# Patient Record
Sex: MALE | Race: WHITE | URBAN - METROPOLITAN AREA
[De-identification: names, ages, dates, MRNs, and addresses within clinical notes are randomized per-mention and may not be internally consistent; named-entity substitution may affect disease eponyms.]

---

## 2017-01-17 ENCOUNTER — TRANSFERRED RECORDS (OUTPATIENT)
Dept: HEALTH INFORMATION MANAGEMENT | Facility: CLINIC | Age: 62
End: 2017-01-17

## 2017-02-24 ENCOUNTER — COMMUNICATION - HEALTHEAST (OUTPATIENT)
Dept: VASCULAR SURGERY | Facility: CLINIC | Age: 62
End: 2017-02-24

## 2017-03-01 ENCOUNTER — OFFICE VISIT - HEALTHEAST (OUTPATIENT)
Dept: VASCULAR SURGERY | Facility: CLINIC | Age: 62
End: 2017-03-01

## 2017-03-01 DIAGNOSIS — I87.2 VENOUS INSUFFICIENCY OF BOTH LOWER EXTREMITIES: ICD-10-CM

## 2017-03-01 DIAGNOSIS — I83.893 SYMPTOMATIC VARICOSE VEINS OF BOTH LOWER EXTREMITIES: ICD-10-CM

## 2017-03-14 ENCOUNTER — OFFICE VISIT (OUTPATIENT)
Dept: PSYCHIATRY | Facility: CLINIC | Age: 62
End: 2017-03-14
Attending: PSYCHIATRY & NEUROLOGY
Payer: MEDICARE

## 2017-03-14 VITALS — WEIGHT: 213.96 LBS | SYSTOLIC BLOOD PRESSURE: 151 MMHG | HEART RATE: 85 BPM | DIASTOLIC BLOOD PRESSURE: 90 MMHG

## 2017-03-14 DIAGNOSIS — Z79.899 ENCOUNTER FOR LONG-TERM (CURRENT) USE OF MEDICATIONS: Primary | ICD-10-CM

## 2017-03-14 DIAGNOSIS — F11.21 OPIOID DEPENDENCE IN REMISSION (H): ICD-10-CM

## 2017-03-14 PROCEDURE — 99212 OFFICE O/P EST SF 10 MIN: CPT | Mod: ZF

## 2017-03-14 RX ORDER — BUPRENORPHINE 2 MG/1
4 TABLET SUBLINGUAL 4 TIMES DAILY
Qty: 240 TABLET | Refills: 2
Start: 2017-03-14 | End: 2017-06-06

## 2017-03-14 RX ORDER — BUPRENORPHINE 2 MG/1
4 TABLET SUBLINGUAL 4 TIMES DAILY
Qty: 240 TABLET | Refills: 2 | Status: SHIPPED | OUTPATIENT
Start: 2017-03-14 | End: 2017-03-14

## 2017-03-14 NOTE — NURSING NOTE
Chief Complaint   Patient presents with     Recheck Medication     opioid type dependence     Reviewed allergies, smoking status, and pharmacy preference  Administered abuse screening questions   Obtained weight, blood pressure and heart rate

## 2017-03-14 NOTE — MR AVS SNAPSHOT
After Visit Summary   3/14/2017    Mauro Tamez    MRN: [id number]           Patient Information     Date Of Birth          1955        Visit Information        Provider Department      3/14/2017 1:30 PM Khloe Boyce MD Psychiatry Clinic        Today's Diagnoses     Encounter for long-term (current) use of medications    -  1    Opioid dependence in remission (H)           Follow-ups after your visit        Follow-up notes from your care team     Return in about 3 months (around 2017).      Your next 10 appointments already scheduled     2017  1:30 PM CDT   Adult Med Follow UP with Khloe Boyce MD   Psychiatry Clinic ([facility])    [address] [facility] [address] [phone]              Who to contact     Please call your clinic at [phone] to:    Ask questions about your health    Make or cancel appointments    Discuss your medicines    Learn about your test results    Speak to your doctor   If you have compliments or concerns about an experience at your clinic, or if you wish to file a complaint, please contact [facility] Patient Relations at [phone] or email us at [email or web address]         Additional Information About Your Visit        MyChart Information     PartSimplet is an electronic gateway that provides easy, online access to your medical records. With mygola, you can request a clinic appointment, read your test results, renew a prescription or communicate with your care team.     To sign up for PartSimplet visit the website at [email or web address]   You will be asked to enter the access code listed below, as well as some personal information. Please follow the directions to create your username and password.     Your access code is: [email or web address]  Expires: 2017  9:02 AM     Your access code will  in 90 days. If you need help or a new  code, please contact your [facility] or call [phone] for assistance.        Care EveryWhere ID     This is your Care EveryWhere ID. This could be used by other organizations to access your [place] medical records  [id number]        Your Vitals Were     Pulse                   85            Blood Pressure from Last 3 Encounters:   03/14/17 151/90   12/13/16 119/80   10/18/16 144/85    Weight from Last 3 Encounters:   03/14/17 97.1 kg (213 lb 15.4 oz)   12/13/16 98.9 kg (218 lb)   10/18/16 97.1 kg (214 lb)                 Today's Medication Changes          These changes are accurate as of: 3/14/17 11:59 PM.  If you have any questions, ask your nurse or doctor.               Start taking these medicines.        Dose/Directions    buprenorphine 2 MG Subl sublingual tablet   Commonly known as:  SUBUTEX   Used for:  Opioid dependence in remission (H)   Started by:  Hannah Monroy RN        Dose:  4 mg   Place 2 tablets (4 mg) under the tongue 4 times daily   Quantity:  240 tablet   Refills:  2            Where to get your medicines      Some of these will need a paper prescription and others can be bought over the counter.  Ask your nurse if you have questions.     You don't need a prescription for these medications     buprenorphine 2 MG Subl sublingual tablet                Primary Care Provider Office Phone # Fax #    Deann Crandall [phone]       [place] [id number]        Thank you!     Thank you for choosing PSYCHIATRY CLINIC  for your care. Our goal is always to provide you with excellent care. Hearing back from our patients is one way we can continue to improve our services. Please take a few minutes to complete the written survey that you may receive in the mail after your visit with us. Thank you!             Your Updated Medication List - Protect others around you: Learn how to safely use, store  and throw away your medicines at www.disposemymeds.org.          This list is accurate as of: 3/14/17 11:59 PM.  Always use your most recent med list.                   Brand Name Dispense Instructions for use    buprenorphine 2 MG Subl sublingual tablet    SUBUTEX    240 tablet    Place 2 tablets (4 mg) under the tongue 4 times daily       ISENTRESS PO      Take  by mouth.       naproxen 500 MG tablet    NAPROSYN     Take 500 mg by mouth 2 times daily (with meals). prn       TRUVADA 200-300 MG per tablet   Generic drug:  emtricitabine-tenofovir      Take 1 tablet by mouth daily.

## 2017-03-14 NOTE — PROGRESS NOTES
"Progress Notes  Encounter Date: 3-14-17  Khloe Boyce MD   Psychiatry      PSYCHIATRY VISIT:  The patient was seen for evaluation and management of opioid use disorder.   Total time 30 minutes, counseling and coordination of care 20 minutes.   The patient was seen 3 months ago.  INTERIM HX: He is distressed by the national events but is able to work his program and maintain a positive attitude.  He is able to maintain an \"attitude of gratitude.\" As part o f his program he goes to penitentiary/detox. He has a very strong daphne and commitment to recovery to maintain a spiritual base, working the steps, and prayer. He has sponsor, sponsees, recovery friends.  Mood has been ok.   The grandchildren are not currently a part of his life.  He is accepting of this though hopes this will be different.   He has not filled rxs for oxycodone since December 2016.  Hip pain is managed by aleve. He wears his knee brace.   He continues to get Rxs for oxycodone for his pain and although it shouldn't have any effect, he finds that it does. It is being prescribed by his primary. We have discussed this in some detail; as we have the BZ which he uses infrequently. He is very cautious with his meds.    Last use of drugs was 1988, last alcohol 18 yrs ago.      MEDICATIONS:   Subutex 4 mg qid;    lorazapam 1.0 mg QHS infrequently; 4 of them this past month. He gets 12/month.  Oxycodone for pain #45/month - not taken since December  HIV meds unchanged from last visit. . Truvada, Esentress , no gabapentin. Dr. Rogers is his physician for HIV, testosterone. In the past on Trazodone but it made his mouth very dry and still could not stay asleep.     SE: sexual dysfunction,  He is comfortable on this dose physically and does not think it possible after 12 yrs of being on the med, to go off even with a slow taper. It has not worked in the past; he becomes too uncomfortable. Sleep wakes many times.     MENTAL STATUS EXAM: The patient is neat, on " time for appointment. Mood is irritable, affect consistent.  . Thought processes are logical and goal directed. Thought content is normal. Associations intact. Speech RRR, language fluent, concentration good, fund of knowledge good, memory intact, Insight and judgment are good.   I spent a total of 30 minutes face-to-face with Mauro Tamez during today's office visit. Over 50% of this time was spent counseling the patient and/or coordinating care regarding the combination of medications and risks/benefits . See note for details.           ASSESSMENT: Opioid dependence, in full remission, on agonist; AIDS, chronic pain, s/p rotator cuff surgery. Venous insufficiency, groin pain, S/P DVT  PLAN:   Continue subutex dose to 4 mg qid- he finds it works best .   RTC 3 months,    U tox at his primary's office

## 2017-03-14 NOTE — TELEPHONE ENCOUNTER
Per STAR from Dr. Boyce a prescription for Subutex 2 mg tabs; take 2 tabs QID #240, 2 RF is phoned to [facility] as script is unable to print in clinic.

## 2017-03-15 NOTE — TELEPHONE ENCOUNTER
-Today located 2 unsigned scripts for Subutex  -Both scripts discarded as refills were phoned to pharmacy yesterday

## 2017-03-28 DIAGNOSIS — Z79.899 ENCOUNTER FOR LONG-TERM (CURRENT) USE OF MEDICATIONS: ICD-10-CM

## 2017-03-28 LAB
AMPHETAMINES UR QL SCN: NORMAL
BARBITURATES UR QL: NORMAL
BENZODIAZ UR QL: NORMAL
CANNABINOIDS UR QL SCN: NORMAL
COCAINE UR QL: NORMAL
ETHANOL UR QL SCN: NORMAL
OPIATES UR QL SCN: NORMAL
PCP UR QL SCN: NORMAL

## 2017-03-28 PROCEDURE — 80307 DRUG TEST PRSMV CHEM ANLYZR: CPT | Performed by: PSYCHIATRY & NEUROLOGY

## 2017-03-28 PROCEDURE — 80306 DRUG TEST PRSMV INSTRMNT: CPT | Performed by: PSYCHIATRY & NEUROLOGY

## 2017-03-28 PROCEDURE — 80320 DRUG SCREEN QUANTALCOHOLS: CPT | Performed by: PSYCHIATRY & NEUROLOGY

## 2017-03-29 ENCOUNTER — TELEPHONE (OUTPATIENT)
Dept: PSYCHIATRY | Facility: CLINIC | Age: 62
End: 2017-03-29

## 2017-03-29 LAB
AMPHETAMINE URINE: NORMAL NG/ML
BARBITUATES URINE: NORMAL NG/ML
BENZODIAZEPINES URINE: NORMAL NG/ML
CREATININE, URINE: 62 MG/DL
FENTANYL: NORMAL NG/ML
Lab: NORMAL NG/ML
METHADONE URINE: NORMAL NG/ML
MORPHINE URINE: NORMAL NG/ML
OXYCODONE URINE: NORMAL NG/ML
PH UR STRIP: NORMAL PH (ref 5–7)
PROPOXYPHENE, URINE: NORMAL NG/ML
THC URINE: NORMAL NG/ML

## 2017-03-29 NOTE — TELEPHONE ENCOUNTER
Received multiple urine screening results from [facility], drawn on 01/17/2017. Results entered into EMR. Sent to Dr. Boyce for review. Original fax placed in scanning on 03/29/2017 and a copy was kept in psychiatry until scanning completed/confirmed. Maria G Shelley LPN

## 2017-03-30 LAB
AMPHETAMINES UR QL: NEGATIVE NG/ML
BARBITURATES UR QL SCN: NEGATIVE NG/ML
BENZODIAZ UR QL SCN: NEGATIVE NG/ML
BUPRENORPHINE UR QL: NORMAL
BUPRENORPHINE UR QL: POSITIVE NG/ML
CANNABINOIDS UR QL: NEGATIVE NG/ML
COCAINE UR QL SCN: NEGATIVE NG/ML
D-METHAMPHET UR QL: NEGATIVE NG/ML
METHADONE UR QL SCN: NEGATIVE NG/ML
OPIATES UR QL SCN: NEGATIVE NG/ML
OXYCODONE UR QL SCN: NEGATIVE NG/ML
PCP UR QL SCN: NEGATIVE NG/ML
PROPOXYPH UR QL: NEGATIVE NG/ML
TRICYCLICS UR QL SCN: NEGATIVE NG/ML

## 2017-04-13 ENCOUNTER — AMBULATORY - HEALTHEAST (OUTPATIENT)
Dept: VASCULAR SURGERY | Facility: CLINIC | Age: 62
End: 2017-04-13

## 2017-04-13 ENCOUNTER — COMMUNICATION - HEALTHEAST (OUTPATIENT)
Dept: VASCULAR SURGERY | Facility: CLINIC | Age: 62
End: 2017-04-13

## 2017-04-13 DIAGNOSIS — I83.893 SYMPTOMATIC VARICOSE VEINS OF BOTH LOWER EXTREMITIES: ICD-10-CM

## 2017-06-06 ENCOUNTER — OFFICE VISIT (OUTPATIENT)
Dept: PSYCHIATRY | Facility: CLINIC | Age: 62
End: 2017-06-06
Attending: PSYCHIATRY & NEUROLOGY
Payer: MEDICARE

## 2017-06-06 VITALS — HEART RATE: 67 BPM | WEIGHT: 212.6 LBS | SYSTOLIC BLOOD PRESSURE: 111 MMHG | DIASTOLIC BLOOD PRESSURE: 83 MMHG

## 2017-06-06 DIAGNOSIS — F11.21 OPIOID DEPENDENCE IN REMISSION (H): ICD-10-CM

## 2017-06-06 DIAGNOSIS — Z79.899 ENCOUNTER FOR LONG-TERM (CURRENT) USE OF MEDICATIONS: Primary | ICD-10-CM

## 2017-06-06 PROCEDURE — 99212 OFFICE O/P EST SF 10 MIN: CPT | Mod: ZF

## 2017-06-06 RX ORDER — BUPRENORPHINE 2 MG/1
4 TABLET SUBLINGUAL 4 TIMES DAILY
Qty: 240 TABLET | Refills: 2 | Status: SHIPPED | OUTPATIENT
Start: 2017-06-06 | End: 2017-09-05

## 2017-06-06 NOTE — PROGRESS NOTES
Progress Notes  Encounter Date: 6-6-17  Khloe Boyce MD   Psychiatry       PSYCHIATRY VISIT:  The patient was seen for evaluation and management of opioid use disorder.    The patient was seen 3 months ago.  INTERIM HX: Recovery continues to be stable, very active in AA.  .  He attends meetings, sponsors others, attended [facility] and brought a young person who chronically relapses. He does prayer daily - p 552 - freedom from resentment.  He begins the appt by saying his life is stable, bored at times.  He fills his days with activities, movies.   Mood is ok. His main concern is trouble sleeping. He wakes 3x/night whether or not he takes Ativan.  He has pain in his groin and leg and just had an MRI . He may yell out in pain. He is hoping that he gets some answers.  Finances are stretched and this is an ongoing source of stress.    He continues to get Rxs for oxycodone for his pain and although it shouldn't have any effect with suboxone on board, he finds that it does. It is being prescribed by his primary. We have discussed this in some detail; as we have the BZ which he uses infrequently. He is very cautious with his meds. He has an agreement with his primary that he will continue to receive monthly prescriptions but if he does not need the pain meds then he shreds the rx.     Last use of drugs was 1988, last alcohol 18 yrs ago.  MEDICATIONS:   Subutex 4 mg qid;     lorazapam 1.0 mg QHS infrequently; none in past 2 weeks.  He gets 12/month.  Oxycodone for pain #45/month - not taken since April. If doesn't use it , brings to office and it is shredded.   HIV meds unchanged from last visit. . Truvada, Esentress , no gabapentin. Dr. Rogers is his physician for HIV, testosterone. In the past on Trazodone but it made his mouth very dry and still could not stay asleep.      SE: sexual dysfunction,  He is comfortable on this dose physically and does not think it possible after 12 yrs of being on the med, to go off  even with a slow taper. It has not worked in the past; he becomes too uncomfortable. Sleep wakes many times.      MENTAL STATUS EXAM: The patient is neat, on time for appointment. Mood is fair, affect consistent.  . Thought processes are logical and goal directed. Thought content is normal. Associations intact. Speech RRR, language fluent, concentration good, fund of knowledge good, memory intact, Insight and judgment are good.   I spent a total of 30 minutes face-to-face with Mauro Tamez during today's office visit. Over 50% of this time was spent counseling the patient and/or coordinating care regarding the combination of medications and risks/benefits . See note for details.         ASSESSMENT: Opioid dependence, in full remission, on agonist; AIDS, chronic pain, s/p rotator cuff surgery. Venous insufficiency, groin pain, S/P DVT  PLAN:   Continue subutex dose to 4 mg qid- he finds it works best .   RTC 3 months,    U tox today:   Results: negative except for bup

## 2017-06-06 NOTE — MR AVS SNAPSHOT
After Visit Summary   2017    Mauro Tamez    MRN: [id number]           Patient Information     Date Of Birth          1955        Visit Information        Provider Department      2017 1:30 PM Khloe Boyce MD Psychiatry Clinic        Today's Diagnoses     Encounter for long-term (current) use of medications    -  1    Opioid dependence in remission (H)           Follow-ups after your visit        Follow-up notes from your care team     Return in about 3 months (around 2017).      Your next 10 appointments already scheduled     Sep 05, 2017  1:00 PM CDT   Adult Med Follow UP with Khloe Boyce MD   Psychiatry Clinic ([facility])    [address] [facility] [address] [phone]              Who to contact     Please call your clinic at [phone] to:    Ask questions about your health    Make or cancel appointments    Discuss your medicines    Learn about your test results    Speak to your doctor   If you have compliments or concerns about an experience at your clinic, or if you wish to file a complaint, please contact [facility] Patient Relations at [phone] or email us at [email or web address]         Additional Information About Your Visit        MyChart Information     Social GameWorkst is an electronic gateway that provides easy, online access to your medical records. With ShoutWire, you can request a clinic appointment, read your test results, renew a prescription or communicate with your care team.     To sign up for Social GameWorkst visit the website at [email or web address]   You will be asked to enter the access code listed below, as well as some personal information. Please follow the directions to create your username and password.     Your access code is: [email or web address]  Expires: 10/7/2017 10:56 PM     Your access code will  in 90 days. If you need help or a new  code, please contact your [facility] or call [phone] for assistance.        Care EveryWhere ID     This is your Care EveryWhere ID. This could be used by other organizations to access your [place] medical records  [id number]        Your Vitals Were     Pulse                   67            Blood Pressure from Last 3 Encounters:   06/06/17 111/83   03/14/17 151/90   12/13/16 119/80    Weight from Last 3 Encounters:   06/06/17 96.4 kg (212 lb 9.6 oz)   03/14/17 97.1 kg (213 lb 15.4 oz)   12/13/16 98.9 kg (218 lb)                 Where to get your medicines      Some of these will need a paper prescription and others can be bought over the counter.  Ask your nurse if you have questions.     Bring a paper prescription for each of these medications     buprenorphine 2 MG Subl sublingual tablet          Primary Care Provider Office Phone # Fax #    Deann Crandall [phone]       [address] [address]        Equal Access to Services     [facility]: Hadii aad ku hadasho Sogloria, waaxda luqadaha, qaybta kaalmada rani, tal valladares . So [facility] [phone].    ATENCIÓN: Si habla español, tiene a vernon disposición servicios gratuitos de asistencia lingüística. [facility] [phone].    We comply with applicable federal civil rights laws and [place] laws. We do not discriminate on the basis of race, color, national origin, age, disability sex, sexual orientation or gender identity.            Thank you!     Thank you for choosing PSYCHIATRY CLINIC  for your care. Our goal is always to provide you with excellent care. Hearing back from our patients is one way we can continue to improve our services. Please take a few minutes to complete the written survey that you may receive in the mail after your visit with us. Thank you!             Your Updated Medication List - Protect others around  you: Learn how to safely use, store and throw away your medicines at www.disposemymeds.org.          This list is accurate as of: 6/6/17 11:59 PM.  Always use your most recent med list.                   Brand Name Dispense Instructions for use Diagnosis    buprenorphine 2 MG Subl sublingual tablet    SUBUTEX    240 tablet    Place 2 tablets (4 mg) under the tongue 4 times daily    Opioid dependence in remission (H)       ISENTRESS PO      Take  by mouth.        naproxen 500 MG tablet    NAPROSYN     Take 500 mg by mouth 2 times daily (with meals). prn        TRUVADA 200-300 MG per tablet   Generic drug:  emtricitabine-tenofovir      Take 1 tablet by mouth daily.

## 2017-07-03 ENCOUNTER — TELEPHONE (OUTPATIENT)
Dept: PSYCHIATRY | Facility: CLINIC | Age: 62
End: 2017-07-03

## 2017-07-03 NOTE — TELEPHONE ENCOUNTER
Appt history:  Last seen: 6/6/17 (note unsigned)  Next appt: 9/5/17    Incoming fax:   -Rec'd fax from [facility]  -Their records indicate that pt is receiving buprenorphine and oxycodone    Per [place] :   Oxycodone 5 mg tabs #45 filled on:   1/5/17  2/4/17  3/19/17  4/23/17  6/24/17    Ativan 2 mg tabs #12 filled on:   1/30/17  3/5/17  4/6/17  5/15/17  6/18/17    Valium 2 mg tab #20 filled on:   4/23/17    Plan:   -Will alert Dr. Boyce

## 2017-07-06 DIAGNOSIS — F11.21 OPIOID DEPENDENCE IN REMISSION (H): ICD-10-CM

## 2017-07-06 DIAGNOSIS — Z79.899 ENCOUNTER FOR LONG-TERM (CURRENT) USE OF MEDICATIONS: ICD-10-CM

## 2017-07-06 PROCEDURE — 80299 QUANTITATIVE ASSAY DRUG: CPT | Performed by: PSYCHIATRY & NEUROLOGY

## 2017-07-06 PROCEDURE — 80307 DRUG TEST PRSMV CHEM ANLYZR: CPT | Performed by: PSYCHIATRY & NEUROLOGY

## 2017-07-06 PROCEDURE — 80320 DRUG SCREEN QUANTALCOHOLS: CPT | Performed by: PSYCHIATRY & NEUROLOGY

## 2017-07-09 LAB
BUPRENORPHINE GLUCURONIDE URINE: 60
BUPRENORPHINE UR CFM-MCNC: 6 NG/ML
NALOXONE URINE: NORMAL
NORBUPRENORPHINE GLUCURONIDE URINE: 192
NORBUPRENORPHINE UR CFM-MCNC: 55 NG/ML

## 2017-07-13 ENCOUNTER — TELEPHONE (OUTPATIENT)
Dept: PSYCHIATRY | Facility: CLINIC | Age: 62
End: 2017-07-13

## 2017-07-13 DIAGNOSIS — F11.21 OPIOID DEPENDENCE IN REMISSION (H): ICD-10-CM

## 2017-07-13 NOTE — TELEPHONE ENCOUNTER
-Rec'd handwritten letter from pt along with letter from [employer]  -Coverage for buprenorphine 2 mg tabs will  on 17  -Included is Quantity Limit Exception form  -Form completed and faxed to Josette at [phone] -lvM for pt at home number with this information (did not leave specific medication name on vm)  -Shared that writer would update pt once decision has been made  -Clinic number provided for c/b with any questions or concerns prior to then.

## 2017-07-14 NOTE — TELEPHONE ENCOUNTER
-Rec'd fax from Josette CRUZ approved from 4/15/17 - 7/14/18  -Pt notified via vm (did not leave name of medication on vm)  -Clinic number provided for c/b with questions  -PA forms placed in scanning

## 2017-09-05 ENCOUNTER — OFFICE VISIT (OUTPATIENT)
Dept: PSYCHIATRY | Facility: CLINIC | Age: 62
End: 2017-09-05
Attending: PSYCHIATRY & NEUROLOGY
Payer: MEDICARE

## 2017-09-05 VITALS — SYSTOLIC BLOOD PRESSURE: 139 MMHG | HEART RATE: 84 BPM | DIASTOLIC BLOOD PRESSURE: 84 MMHG | WEIGHT: 208.8 LBS

## 2017-09-05 DIAGNOSIS — F11.21 OPIOID DEPENDENCE IN REMISSION (H): ICD-10-CM

## 2017-09-05 DIAGNOSIS — Z79.899 ENCOUNTER FOR LONG-TERM (CURRENT) USE OF MEDICATIONS: ICD-10-CM

## 2017-09-05 DIAGNOSIS — Z79.899 ENCOUNTER FOR LONG-TERM (CURRENT) USE OF MEDICATIONS: Primary | ICD-10-CM

## 2017-09-05 LAB
AMPHETAMINES UR QL SCN: NEGATIVE
BARBITURATES UR QL: NEGATIVE
BENZODIAZ UR QL: NEGATIVE
CANNABINOIDS UR QL SCN: NEGATIVE
COCAINE UR QL: NEGATIVE
ETHANOL UR QL SCN: NEGATIVE
OPIATES UR QL SCN: NEGATIVE
PCP UR QL SCN: NEGATIVE

## 2017-09-05 PROCEDURE — 80299 QUANTITATIVE ASSAY DRUG: CPT | Performed by: PSYCHIATRY & NEUROLOGY

## 2017-09-05 PROCEDURE — 80320 DRUG SCREEN QUANTALCOHOLS: CPT | Performed by: PSYCHIATRY & NEUROLOGY

## 2017-09-05 PROCEDURE — 80307 DRUG TEST PRSMV CHEM ANLYZR: CPT | Performed by: PSYCHIATRY & NEUROLOGY

## 2017-09-05 PROCEDURE — 99212 OFFICE O/P EST SF 10 MIN: CPT | Mod: ZF

## 2017-09-05 RX ORDER — BUPRENORPHINE 2 MG/1
4 TABLET SUBLINGUAL 4 TIMES DAILY
Qty: 240 TABLET | Refills: 2 | Status: SHIPPED | OUTPATIENT
Start: 2017-09-05 | End: 2017-11-27

## 2017-09-05 NOTE — NURSING NOTE
Chief Complaint   Patient presents with     Recheck Medication     Opioid dependence in remission      Reviewed allergies, smoking status, and pharmacy preference  Administered abuse screening question  Obtained weight, blood pressure and heart rate

## 2017-09-08 LAB
BUPRENORPHINE GLUCURONIDE URINE: 169 NG/ML
BUPRENORPHINE UR CFM-MCNC: 28 NG/ML
NALOXONE URINE: <100 NG/ML
NORBUPRENORPHINE GLUCURONIDE URINE: 249 NG/ML
NORBUPRENORPHINE UR CFM-MCNC: 100 NG/ML

## 2017-09-28 ENCOUNTER — COMMUNICATION - HEALTHEAST (OUTPATIENT)
Dept: VASCULAR SURGERY | Facility: CLINIC | Age: 62
End: 2017-09-28

## 2017-09-28 DIAGNOSIS — I87.2 VENOUS INSUFFICIENCY: ICD-10-CM

## 2017-09-29 ENCOUNTER — AMBULATORY - HEALTHEAST (OUTPATIENT)
Dept: VASCULAR SURGERY | Facility: CLINIC | Age: 62
End: 2017-09-29

## 2017-09-29 DIAGNOSIS — I83.893 SYMPTOMATIC VARICOSE VEINS OF BOTH LOWER EXTREMITIES: ICD-10-CM

## 2017-09-29 DIAGNOSIS — I87.2 VENOUS INSUFFICIENCY OF BOTH LOWER EXTREMITIES: ICD-10-CM

## 2017-11-27 ENCOUNTER — TELEPHONE (OUTPATIENT)
Dept: PSYCHIATRY | Facility: CLINIC | Age: 62
End: 2017-11-27

## 2017-11-27 DIAGNOSIS — F11.21 OPIOID DEPENDENCE IN REMISSION (H): ICD-10-CM

## 2017-11-27 RX ORDER — BUPRENORPHINE 2 MG/1
4 TABLET SUBLINGUAL 4 TIMES DAILY
Qty: 240 TABLET | Refills: 0
Start: 2017-11-27 | End: 2017-12-05

## 2017-11-27 NOTE — TELEPHONE ENCOUNTER
----- Message from Norah Todd RN sent at 11/27/2017 12:57 PM CST -----  Regarding: FW: Med Refill/Specker  Contact: [phone]      ----- Message -----     From: Claudia Lu     Sent: 11/27/2017  12:28 PM       To: Hannah Monroy RN  Subject: Med Refill/Specker                               Pharmacy shorted him 16 tablets during his last refill.    Caller: Mauro  Medication: Buprenorphine  Pharmacy and location: [facility]  Have you contacted the pharmacy: yes  How much of medication do you have left: 1 day left  Pending appt: 12/5  Okay to leave VM:  yes

## 2017-11-27 NOTE — TELEPHONE ENCOUNTER
Called patient to let him know that the refill has been called in. He confirmed that he will be at his appointment on 12/05 at 1:30 pm.

## 2017-11-27 NOTE — TELEPHONE ENCOUNTER
Received RF request from patient for:    buprenorphine (SUBUTEX) 2 MG SUBL sublingual tablet 240 tablet 2 9/5/2017  No   Sig: Place 2 tablets (4 mg) under the tongue 4 times daily       Last RF:  Per med tab:  9/5/17 for 3 month supply total  Per [place] :  10/27 (# 240, 30 d), 9/30 (# 240, 30), 9/11 (# 185, 23) all refilled from Rx dated 7/06 issued by Dr. Deann Crandall; no evidence that Rx issued by Dr. Boyce at 9/05/17 appointment has been filled    Due for RF:  yes    Appointment History:  Last appt: 9/10/17  RTC: 3 months  Cancel: none  No-show: none  Next appt: 12/05/17      Called the pharmacy and spoke with Charlene, who reported that they do have the 9/05/17 Rx hard copy, and it was filled and has no valid refills left.  She said that it was erroneously filled under Dr. Crandall which is why the  shows it as being filled under her.  She stated that the patient is not getting buprenorphine from two providers.  Also, she said that they did not have enough to cover a month supply for one refill, and they only billed him and indicated clearly that he only received the quantity that they had and that he was not technically shorted.    Will refill 30 d/s per protocol  and route to Dr. Boyce for FYI.  (Called in to pharmacistDalia)

## 2017-12-05 ENCOUNTER — OFFICE VISIT (OUTPATIENT)
Dept: PSYCHIATRY | Facility: CLINIC | Age: 62
End: 2017-12-05
Attending: PSYCHIATRY & NEUROLOGY
Payer: MEDICARE

## 2017-12-05 VITALS — WEIGHT: 208.2 LBS | HEART RATE: 85 BPM | SYSTOLIC BLOOD PRESSURE: 127 MMHG | DIASTOLIC BLOOD PRESSURE: 87 MMHG

## 2017-12-05 DIAGNOSIS — F11.21 OPIOID DEPENDENCE IN REMISSION (H): ICD-10-CM

## 2017-12-05 DIAGNOSIS — Z79.899 ENCOUNTER FOR LONG-TERM (CURRENT) USE OF MEDICATIONS: ICD-10-CM

## 2017-12-05 PROCEDURE — 99212 OFFICE O/P EST SF 10 MIN: CPT | Mod: ZF

## 2017-12-05 PROCEDURE — 80299 QUANTITATIVE ASSAY DRUG: CPT | Performed by: PSYCHIATRY & NEUROLOGY

## 2017-12-05 PROCEDURE — 80320 DRUG SCREEN QUANTALCOHOLS: CPT | Performed by: PSYCHIATRY & NEUROLOGY

## 2017-12-05 PROCEDURE — 80307 DRUG TEST PRSMV CHEM ANLYZR: CPT | Performed by: PSYCHIATRY & NEUROLOGY

## 2017-12-05 RX ORDER — BUPRENORPHINE 2 MG/1
4 TABLET SUBLINGUAL 4 TIMES DAILY
Qty: 240 TABLET | Refills: 2 | Status: SHIPPED | OUTPATIENT
Start: 2017-12-05 | End: 2018-03-23

## 2017-12-05 NOTE — NURSING NOTE
Chief Complaint   Patient presents with     Recheck Medication     Encounter for long-term (current) use of medications     Reviewed allergies, smoking status, and pharmacy preference  Administered abuse screening questions   Obtained weight, blood pressure and heart rate

## 2017-12-05 NOTE — MR AVS SNAPSHOT
After Visit Summary   2017    Mauro Tamez    MRN: [id number]           Patient Information     Date Of Birth          1955        Visit Information        Provider Department      2017 1:30 PM Khloe Boyce MD Psychiatry Clinic        Today's Diagnoses     Opioid dependence in remission (H)           Follow-ups after your visit        Follow-up notes from your care team     Return in about 3 months (around 3/5/2018).      Your next 10 appointments already scheduled     Mar 06, 2018  1:00 PM CST   Adult Med Follow UP with Khloe Boyce MD   Psychiatry Clinic ([facility])    [address]  [address] [facility] [phone]              Who to contact     Please call your clinic at [phone] to:    Ask questions about your health    Make or cancel appointments    Discuss your medicines    Learn about your test results    Speak to your doctor   If you have compliments or concerns about an experience at your clinic, or if you wish to file a complaint, please contact [facility] Patient Relations at [phone] or email us at [email or web address]         Additional Information About Your Visit        MyChart Information     [employer] is an electronic gateway that provides easy, online access to your medical records. With [employer], you can request a clinic appointment, read your test results, renew a prescription or communicate with your care team.     To sign up for [employer] visit the website at [email or web address]   You will be asked to enter the access code listed below, as well as some personal information. Please follow the directions to create your username and password.     Your access code is: [email or web address]  Expires: 3/5/2018  5:15 PM     Your access code will  in 90 days. If you need help or a new code, please contact your [facility]  Clinic or call [phone] for assistance.        Care EveryWhere ID     This is your Care EveryWhere ID. This could be used by other organizations to access your [place] medical records  [id number]        Your Vitals Were     Pulse                   85            Blood Pressure from Last 3 Encounters:   12/05/17 127/87   09/05/17 139/84   06/06/17 111/83    Weight from Last 3 Encounters:   12/05/17 94.4 kg (208 lb 3.2 oz)   09/05/17 94.7 kg (208 lb 12.8 oz)   06/06/17 96.4 kg (212 lb 9.6 oz)              Today, you had the following     No orders found for display         Where to get your medicines      Some of these will need a paper prescription and others can be bought over the counter.  Ask your nurse if you have questions.     Bring a paper prescription for each of these medications     buprenorphine 2 MG Subl sublingual tablet          Primary Care Provider Office Phone # Fax #    Deann Crandall [phone]       [address] [address]        Equal Access to Services     [facility]: Hadii radha ku haddavid Sogloria, waaxda loly, qaybta vinicio saravia, tal kline. So [facility] [phone].    ATENCIÓN: Si habla español, tiene a vernon disposición servicios gratuitos de asistencia lingüística. [facility] [phone].    We comply with applicable federal civil rights laws and [place] laws. We do not discriminate on the basis of race, color, national origin, age, disability, sex, sexual orientation, or gender identity.            Thank you!     Thank you for choosing PSYCHIATRY CLINIC  for your care. Our goal is always to provide you with excellent care. Hearing back from our patients is one way we can continue to improve our services. Please take a few minutes to complete the written survey that you may receive in the mail after your visit with us. Thank you!             Your Updated Medication List - Protect  others around you: Learn how to safely use, store and throw away your medicines at www.disposemymeds.org.          This list is accurate as of: 12/5/17  5:15 PM.  Always use your most recent med list.                   Brand Name Dispense Instructions for use Diagnosis    buprenorphine 2 MG Subl sublingual tablet    SUBUTEX    240 tablet    Place 2 tablets (4 mg) under the tongue 4 times daily    Opioid dependence in remission (H)       ISENTRESS PO      Take  by mouth.        naproxen 500 MG tablet    NAPROSYN     Take 500 mg by mouth 2 times daily (with meals). prn        TRUVADA 200-300 MG per tablet   Generic drug:  emtricitabine-tenofovir      Take 1 tablet by mouth daily.

## 2017-12-05 NOTE — PROGRESS NOTES
Progress Notes  Khloe Boyce MD   Psychiatry       PSYCHIATRY VISIT:  The patient was seen for evaluation and management of opioid use disorder.    The patient was seen 3 months ago.  INTERIM HX: Recovery continues to be stable, very active in AA.  .  He attends meetings, sponsors others. The last time he was here he tried to fill his rx and [facility] did not have it  They shorted him 20 pills and did not fix it. He called here and got it straightened out.    He went to [facility] but he has trouble with the young persons who are on their cell phones . He goes to meetings 2x/week, has sponsees.  He continuers to have pain in his groin; hip joint fraying. He took opiates throughout the month: one week took daily then sporadic . He has stabbing pain. The oxycodone takes the edge of his pain ; he also takes aleve.  Energy up and down.  Mood ok .  Sleep is ok with his meds. He has some difficulty falling asleep.     He notices when he misses a dose he has withdrawal. Aching.     .  Since his last appt besides the HIV meds and subutex he has taken:  Oxycodone 5 mg :12 in 4 days of 5 mg, 2x/month.  Lorazepam 1.0 mg QHS:  5 in last month; last one was last night.  Sudafed for allergies (told this might appear as amphetamine on screen)  Claritin  Antibiotic (unsure which one)    Finances are stretched and this is an ongoing source of stress.    He continues to get Rxs for oxycodone for his pain and although it shouldn't have any effect with suboxone on board, he finds that it does. It is being prescribed by his primary. We have discussed this in some detail; as we have the BZ which he uses infrequently. He is very cautious with his meds. He has an agreement with his primary that he will continue to receive monthly prescriptions but if he does not need the pain meds then he shreds the rx.     Last use of drugs was 1988, last alcohol 18 yrs ago.    MEDICATIONS:   Subutex 4 mg qid;     lorazapam 1.0 mg QHS  infrequently; 10 in last month; if he is struggling with sleep  Oxycodone #30,/month for groin and back pain, takes the edge off:   He brings the rxs back and are shredded if he doesn't use them.   HIV meds unchanged from last visit. . Truvada, Esentress , no gabapentin. Dr. Rogers is his physician for HIV, testosterone. In the past on Trazodone but it made his mouth very dry and still could not stay asleep. AIDS has been stable      SE: sexual dysfunction,  He is comfortable on this dose physically and does not think it possible after 12 yrs of being on the med, to go off even with a slow taper. It has not worked in the past; he becomes too uncomfortable. Sleep wakes many times.    ROS: Pain in his groin and leg , remainder of 4 pt neg except as above      MENTAL STATUS EXAM: The patient is neat, on time for appointment. Mood is fairly good, affect consistent.  . Thought processes are logical and goal directed. Thought content is normal. Associations intact. Speech RRR, language fluent, concentration good, fund of knowledge good, memory intact, Insight and judgment are good.   I spent a total of 30 minutes face-to-face with Mauro Tamez during today's office visit. Over 50% of this time was spent counseling the patient and/or coordinating care regarding the combination of medications and risks/benefits . See note for details.          ASSESSMENT: Opioid dependence, in full remission, on agonist; AIDS, chronic pain, s/p rotator cuff surgery. Venous insufficiency, groin pain, S/P DVT  PLAN:   Continue subutex dose  4 mg qid- he finds it works best .   While not optimal, the opioid and benzo prescribed by his PCP seems reasonable given his AIDS and his very active recovery program and no signs of misuse.   RTC 3 months,    Drug screen today

## 2017-12-08 LAB
BUPRENORPHINE GLUCURONIDE URINE: 441 NG/ML
BUPRENORPHINE UR CFM-MCNC: 46 NG/ML
NALOXONE URINE: <100 NG/ML
NORBUPRENORPHINE GLUCURONIDE URINE: 359 NG/ML
NORBUPRENORPHINE UR CFM-MCNC: 94 NG/ML

## 2018-03-21 ENCOUNTER — COMMUNICATION - HEALTHEAST (OUTPATIENT)
Dept: VASCULAR SURGERY | Facility: CLINIC | Age: 63
End: 2018-03-21

## 2018-03-22 ENCOUNTER — AMBULATORY - HEALTHEAST (OUTPATIENT)
Dept: VASCULAR SURGERY | Facility: CLINIC | Age: 63
End: 2018-03-22

## 2018-03-22 DIAGNOSIS — I83.893 SYMPTOMATIC VARICOSE VEINS OF BOTH LOWER EXTREMITIES: ICD-10-CM

## 2018-03-22 DIAGNOSIS — I87.2 VENOUS INSUFFICIENCY: ICD-10-CM

## 2018-03-23 ENCOUNTER — TELEPHONE (OUTPATIENT)
Dept: PSYCHIATRY | Facility: CLINIC | Age: 63
End: 2018-03-23

## 2018-03-23 DIAGNOSIS — F11.21 OPIOID DEPENDENCE IN REMISSION (H): ICD-10-CM

## 2018-03-23 RX ORDER — BUPRENORPHINE 2 MG/1
4 TABLET SUBLINGUAL 4 TIMES DAILY
Qty: 32 TABLET | Refills: 0
Start: 2018-03-23 | End: 2018-03-27

## 2018-03-23 NOTE — TELEPHONE ENCOUNTER
Rec'd a VM from Dr. Boyce that it is okay to refill Suboxone.  Will refill a 4 d/s.    LVM for pt with update.  Reminded of upcoming appt date/time.    Routed to Dr. Boyce as PAULA

## 2018-03-23 NOTE — TELEPHONE ENCOUNTER
Last seen: 12/5/17  RTC: 3 months  Cancel: 3/6/18  No-show: none  Next appt: 3/27/18     Pt is currently prescribed Subutex 4 mg QID.    Per [place]-:  - Suboxone 2 mg tabs #240 last filled: 2/24/18, 1/25/18, 12/27/17    Of note, pt also recently received:  - Oxycodone hcl 5 mg tabs #45- 3/6/18, 12/26/17 (provider aware)    Text-paged Dr. Boyce

## 2018-03-23 NOTE — TELEPHONE ENCOUNTER
----- Message from Estephania Ferraro RN sent at 3/22/2018  4:04 PM CDT -----  Regarding: FW: Refill - just a few until next appt  Contact: [phone]      ----- Message -----     From: Elicia Breen     Sent: 3/22/2018   3:33 PM       To: Hannah Monroy RN  Subject: Refill - just a few until next appt              Buprenorphine 2mg sublingual tablets - is it possible to have this filled through the 27th?  Script runs out on 3/24/18.    [facility]

## 2018-03-27 ENCOUNTER — OFFICE VISIT (OUTPATIENT)
Dept: PSYCHIATRY | Facility: CLINIC | Age: 63
End: 2018-03-27
Attending: PSYCHIATRY & NEUROLOGY
Payer: MEDICARE

## 2018-03-27 VITALS — HEART RATE: 85 BPM | DIASTOLIC BLOOD PRESSURE: 83 MMHG | WEIGHT: 205.4 LBS | SYSTOLIC BLOOD PRESSURE: 133 MMHG

## 2018-03-27 DIAGNOSIS — Z79.899 ENCOUNTER FOR LONG-TERM (CURRENT) USE OF MEDICATIONS: ICD-10-CM

## 2018-03-27 DIAGNOSIS — F11.21 OPIOID DEPENDENCE IN REMISSION (H): ICD-10-CM

## 2018-03-27 LAB
AMPHETAMINES UR QL SCN: NEGATIVE
BARBITURATES UR QL: NEGATIVE
BENZODIAZ UR QL: NEGATIVE
CANNABINOIDS UR QL SCN: NEGATIVE
COCAINE UR QL: NEGATIVE
ETHANOL UR QL SCN: NEGATIVE
OPIATES UR QL SCN: POSITIVE
PCP UR QL SCN: NEGATIVE

## 2018-03-27 PROCEDURE — 80320 DRUG SCREEN QUANTALCOHOLS: CPT | Performed by: PSYCHIATRY & NEUROLOGY

## 2018-03-27 PROCEDURE — 80299 QUANTITATIVE ASSAY DRUG: CPT | Performed by: PSYCHIATRY & NEUROLOGY

## 2018-03-27 PROCEDURE — G0463 HOSPITAL OUTPT CLINIC VISIT: HCPCS | Mod: ZF

## 2018-03-27 PROCEDURE — 80307 DRUG TEST PRSMV CHEM ANLYZR: CPT | Mod: 91 | Performed by: PSYCHIATRY & NEUROLOGY

## 2018-03-27 RX ORDER — BUPRENORPHINE 2 MG/1
4 TABLET SUBLINGUAL 4 TIMES DAILY
Qty: 240 TABLET | Refills: 2 | Status: SHIPPED | OUTPATIENT
Start: 2018-03-27 | End: 2018-06-05

## 2018-03-27 ASSESSMENT — PAIN SCALES - GENERAL: PAINLEVEL: NO PAIN (0)

## 2018-03-27 NOTE — PROGRESS NOTES
Progress Notes  Khloe Boyce MD   Psychiatry       PSYCHIATRY VISIT:  The patient was seen for evaluation and management of opioid use disorder.    The patient was seen 3 months ago.  INTERIM HX:The patient has had the flu and also thrush.  He is worried about his immune system. This is the third time with thrush.    Yesterday he saw his AIDS worker. He is in a program which helps him pay bills. He is changing insurance to [employer]. He was told he needs 5 tooth extractions. Getting dental care has proven to be major problem due to having HIV. Other than that he is ok, mood ok.     He continuers to have pain in his groin; hip joint fraying. This pain has worsened the past few weeks. He wants to get a second opinion about the current plan for surgery- arthroscopic for pinched nerve.     Recovery continues to be stable, very active in AA.  He has 20 yrs of sobriety. He has a new sponsee but he doesn't want to work the steps. This person has a TBI from a heroin OD. .  Pt attends meetings, sponsors others. He has a regular routine.    .Since his last appt besides the HIV meds and subutex he has taken:  Oxycodone 5 mg :this past month, 4-5x/week but previously a few times/month.   Lorazepam 1.0 mg QHS prn:  5 in last month; last one was last night.  Sudafed for allergies (told this might appear as amphetamine on screen)  Claritin    Finances are  an ongoing source of stress.    He continues to get Rxs for oxycodone for his pain and although it shouldn't have any effect with suboxone on board, he finds that it does. It is being prescribed by his primary. We have discussed this in some detail; as we have the BZ which he uses infrequently. He is very cautious with his meds. He has an agreement with his primary that he will continue to receive monthly prescriptions but if he does not need the pain meds then he shreds the rx.     Last use of drugs was 1988,       SE: sexual dysfunction,  He is comfortable on this dose  physically and does not think it possible after 12 yrs of being on the med, to go off even with a slow taper. It has not worked in the past; he becomes too uncomfortable. Sleep wakes many times.    ROS: Pain in his groin and leg , remainder of 4 pt neg except as above      MENTAL STATUS EXAM: The patient is neat, on time for appointment. Oriented. Mood is fairly good, affect consistent.  . Thought processes are logical and goal directed. Thought content is normal. Associations intact. Speech RRR, language fluent, concentration good, fund of knowledge good, memory intact, Insight and judgment are good. Gait normal, cognition appears intact but not formally tested.     I spent a total of 30 minutes face-to-face with Mauro Sergo Dashawn during today's office visit. Over 50% of this time was spent counseling the patient and/or coordinating care regarding the combination of medications and risks/benefits . See note for details.          ASSESSMENT: Opioid dependence, in full remission, on agonist; AIDS, chronic pain, s/p rotator cuff surgery. Venous insufficiency, groin pain, back pain ,S/P DVT    PLAN:   Continue subutex dose  4 mg qid- he finds it works best .   While not optimal, the opioid and benzo prescribed by his PCP seems reasonable given his AIDS and his very active recovery program and no signs of misuse.   RTC 3 months,    Drug screen today    To complete at next visit.   Psychiatry Clinic Individual Psychotherapy Note                                                                     [16]   Start time - N/A        End time - N/A  Date last reviewed - N/A       Date next due - N/A    Subjective: This supportive psychotherapy session addressed issues related to current stressors consisting of financial, family.  Patient's reaction: Action in the context of mental status appropriate for ambulatory setting.  Progress: good  Plan: RTC 3mo  Psychotherapy services during this visit included  myself and the patient.    Treatment Plan      SYMPTOMS; PROBLEMS   MEASURABLE GOALS;    FUNCTIONAL IMPROVEMENT INTERVENTIONS;   GAINS MADE DISCHARGE CRITERIA   Substance Use: opioids   maintaining stable recovery, abstinence, recovery activities building on strengths  communication skills  community/ family support  increase coping skills  medications   self-care skills sustained recovery and medications not needed   Psychosocial: access to healthcare, financial hardship and relationship stress   locate resources, information to aid in decision making about health care acceptance of limitations/reality  communication skills  self-care skills symptom resolution

## 2018-03-27 NOTE — MR AVS SNAPSHOT
After Visit Summary   3/27/2018    Mauro Tamez    MRN: [id number]           Patient Information     Date Of Birth          1955        Visit Information        Provider Department      3/27/2018 2:00 PM Khloe Boyce MD Psychiatry Clinic        Today's Diagnoses     Opioid dependence in remission (H)           Follow-ups after your visit        Follow-up notes from your care team     Return in about 3 months (around 2018).      Your next 10 appointments already scheduled     2018  3:00 PM CDT   Adult Med Follow UP with Khloe Boyce MD   Psychiatry Clinic ([facility])    [address]  [address] [phone]              Who to contact     Please call your clinic at [phone] to:    Ask questions about your health    Make or cancel appointments    Discuss your medicines    Learn about your test results    Speak to your doctor            Additional Information About Your Visit        MyChart Information     [employer] is an electronic gateway that provides easy, online access to your medical records. With [employer], you can request a clinic appointment, read your test results, renew a prescription or communicate with your care team.     To sign up for [employer] visit the website at [email or web address]   You will be asked to enter the access code listed below, as well as some personal information. Please follow the directions to create your username and password.     Your access code is: [email or web address]  Expires: 2018  4:18 PM     Your access code will  in 90 days. If you need help or a new code, please contact your [facility] or call [phone] for assistance.        Care EveryWhere ID     This is your Care EveryWhere ID. This could be used by other organizations to access your [facility] medical records  [id number]        Your Vitals Were     Pulse                    85            Blood Pressure from Last 3 Encounters:   03/27/18 133/83   12/05/17 127/87   09/05/17 139/84    Weight from Last 3 Encounters:   03/27/18 93.2 kg (205 lb 6.4 oz)   12/05/17 94.4 kg (208 lb 3.2 oz)   09/05/17 94.7 kg (208 lb 12.8 oz)              Today, you had the following     No orders found for display         Where to get your medicines      Some of these will need a paper prescription and others can be bought over the counter.  Ask your nurse if you have questions.     Bring a paper prescription for each of these medications     buprenorphine 2 MG Subl sublingual tablet          Primary Care Provider Office Phone # Fax #    Deann Crandall [phone]       [facility] [address] [address]        Equal Access to Services     [facility]: Hadii aad ku hadasho Sogloria, waaxda luqadaha, qaybta kaalmada adesoteroyada, tal valladares . So [facility] [phone].    ATENCIÓN: Si habla español, tiene a vernon disposición servicios gratuitos de asistencia lingüística. Llame al [phone].    We comply with applicable federal civil rights laws and [place] laws. We do not discriminate on the basis of race, color, national origin, age, disability, sex, sexual orientation, or gender identity.            Thank you!     Thank you for choosing PSYCHIATRY CLINIC  for your care. Our goal is always to provide you with excellent care. Hearing back from our patients is one way we can continue to improve our services. Please take a few minutes to complete the written survey that you may receive in the mail after your visit with us. Thank you!             Your Updated Medication List - Protect others around you: Learn how to safely use, store and throw away your medicines at www.disposemymeds.org.          This list is accurate as of 3/27/18  4:18 PM.  Always use your most recent med list.                   Brand Name Dispense  Instructions for use Diagnosis    buprenorphine 2 MG Subl sublingual tablet    SUBUTEX    240 tablet    Place 2 tablets (4 mg) under the tongue 4 times daily    Opioid dependence in remission (H)       ISENTRESS PO      Take  by mouth.        naproxen 500 MG tablet    NAPROSYN     Take 500 mg by mouth 2 times daily (with meals). prn        TRUVADA 200-300 MG per tablet   Generic drug:  emtricitabine-tenofovir      Take 1 tablet by mouth daily.

## 2018-03-27 NOTE — NURSING NOTE
Chief Complaint   Patient presents with     Recheck Medication     Opioid dependence in remission      Reviewed allergies, smoking status, pharmacy preference, and medications  Obtained weight, blood pressure and heart rate

## 2018-03-31 LAB
BUPRENORPHINE GLUCURONIDE URINE: 275 NG/ML
BUPRENORPHINE UR CFM-MCNC: 11 NG/ML
NALOXONE URINE: <100 NG/ML
NORBUPRENORPHINE GLUCURONIDE URINE: 282 NG/ML
NORBUPRENORPHINE UR CFM-MCNC: 66 NG/ML

## 2018-04-03 ENCOUNTER — COMMUNICATION - HEALTHEAST (OUTPATIENT)
Dept: VASCULAR SURGERY | Facility: CLINIC | Age: 63
End: 2018-04-03

## 2018-06-05 ENCOUNTER — OFFICE VISIT (OUTPATIENT)
Dept: PSYCHIATRY | Facility: CLINIC | Age: 63
End: 2018-06-05
Attending: PSYCHIATRY & NEUROLOGY
Payer: MEDICARE

## 2018-06-05 VITALS — WEIGHT: 205.2 LBS | HEART RATE: 73 BPM | DIASTOLIC BLOOD PRESSURE: 83 MMHG | SYSTOLIC BLOOD PRESSURE: 133 MMHG

## 2018-06-05 DIAGNOSIS — F11.21 OPIOID DEPENDENCE IN REMISSION (H): ICD-10-CM

## 2018-06-05 DIAGNOSIS — Z79.899 ENCOUNTER FOR LONG-TERM (CURRENT) USE OF MEDICATIONS: ICD-10-CM

## 2018-06-05 PROCEDURE — 80299 QUANTITATIVE ASSAY DRUG: CPT | Performed by: PSYCHIATRY & NEUROLOGY

## 2018-06-05 PROCEDURE — 80307 DRUG TEST PRSMV CHEM ANLYZR: CPT | Performed by: PSYCHIATRY & NEUROLOGY

## 2018-06-05 PROCEDURE — 80320 DRUG SCREEN QUANTALCOHOLS: CPT | Performed by: PSYCHIATRY & NEUROLOGY

## 2018-06-05 PROCEDURE — G0463 HOSPITAL OUTPT CLINIC VISIT: HCPCS | Mod: ZF

## 2018-06-05 RX ORDER — BUPRENORPHINE 2 MG/1
4 TABLET SUBLINGUAL 4 TIMES DAILY
Qty: 240 TABLET | Refills: 2 | Status: SHIPPED | OUTPATIENT
Start: 2018-06-05 | End: 2018-09-04

## 2018-06-05 ASSESSMENT — PAIN SCALES - GENERAL: PAINLEVEL: NO PAIN (0)

## 2018-06-05 NOTE — MR AVS SNAPSHOT
After Visit Summary   2018    Mauro Tamez    MRN: [id number]           Patient Information     Date Of Birth          1955        Visit Information        Provider Department      2018 3:00 PM Khloe Boyce MD Psychiatry Clinic        Today's Diagnoses     Opioid dependence in remission (H)           Follow-ups after your visit        Follow-up notes from your care team     Return in about 3 months (around 2018).      Your next 10 appointments already scheduled     Sep 04, 2018  1:30 PM CDT   Adult Med Follow UP with Khloe Boyce MD   Psychiatry Clinic ([facility])    [address]  [address] [phone]              Who to contact     Please call your clinic at [phone] to:    Ask questions about your health    Make or cancel appointments    Discuss your medicines    Learn about your test results    Speak to your doctor            Additional Information About Your Visit        MyChart Information     [employer] is an electronic gateway that provides easy, online access to your medical records. With [employer], you can request a clinic appointment, read your test results, renew a prescription or communicate with your care team.     To sign up for [employer] visit the website at [email or web address]   You will be asked to enter the access code listed below, as well as some personal information. Please follow the directions to create your username and password.     Your access code is: [email or web address]  Expires: 2018  1:06 PM     Your access code will  in 90 days. If you need help or a new code, please contact your [facility] or call [phone] for assistance.        Care EveryWhere ID     This is your Care EveryWhere ID. This could be used by other organizations to access your [place] medical records  [id number]        Your Vitals Were     Pulse                    73            Blood Pressure from Last 3 Encounters:   06/05/18 133/83   03/27/18 133/83   12/05/17 127/87    Weight from Last 3 Encounters:   06/05/18 93.1 kg (205 lb 3.2 oz)   03/27/18 93.2 kg (205 lb 6.4 oz)   12/05/17 94.4 kg (208 lb 3.2 oz)              Today, you had the following     No orders found for display         Where to get your medicines      Some of these will need a paper prescription and others can be bought over the counter.  Ask your nurse if you have questions.     Bring a paper prescription for each of these medications     buprenorphine 2 MG Subl sublingual tablet          Primary Care Provider Office Phone # Fax #    Deann Crandall [phone]       [address] [address]        Equal Access to Services     [facility] : Hadii radha yancey Sogloria, waaxda loly, qaybta kaalmasebastián saravia, tal valladares . So [facility] [phone].    ATENCIÓN: Si habla español, tiene a vernon disposición servicios gratuitos de asistencia lingüística. Llame al [phone].    We comply with applicable federal civil rights laws and [place] laws. We do not discriminate on the basis of race, color, national origin, age, disability, sex, sexual orientation, or gender identity.            Thank you!     Thank you for choosing PSYCHIATRY CLINIC  for your care. Our goal is always to provide you with excellent care. Hearing back from our patients is one way we can continue to improve our services. Please take a few minutes to complete the written survey that you may receive in the mail after your visit with us. Thank you!             Your Updated Medication List - Protect others around you: Learn how to safely use, store and throw away your medicines at www.disposemymeds.org.          This list is accurate as of 6/5/18 11:59 PM.  Always use your most recent med list.                   Brand Name Dispense  Instructions for use Diagnosis    buprenorphine 2 MG Subl sublingual tablet    SUBUTEX    240 tablet    Place 2 tablets (4 mg) under the tongue 4 times daily    Opioid dependence in remission (H)       ISENTRESS PO      Take  by mouth.        naproxen 500 MG tablet    NAPROSYN     Take 500 mg by mouth 2 times daily (with meals). prn        TRUVADA 200-300 MG per tablet   Generic drug:  emtricitabine-tenofovir      Take 1 tablet by mouth daily.
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
## 2018-06-05 NOTE — NURSING NOTE
Chief Complaint   Patient presents with     Recheck Medication     Opioid dependence in remission

## 2018-06-08 LAB
BUPRENORPHINE GLUCURONIDE URINE: 176 NG/ML
BUPRENORPHINE UR CFM-MCNC: 30 NG/ML
NALOXONE URINE: <100 NG/ML
NORBUPRENORPHINE GLUCURONIDE URINE: 330 NG/ML
NORBUPRENORPHINE UR CFM-MCNC: 60 NG/ML

## 2018-07-18 ENCOUNTER — TELEPHONE (OUTPATIENT)
Dept: PSYCHIATRY | Facility: CLINIC | Age: 63
End: 2018-07-18

## 2018-07-18 NOTE — TELEPHONE ENCOUNTER
-Fax form [employer] [place]  -PA needed for buprenorphine  -PA submitted via CoverMyMeds  -Requested expedited review

## 2018-07-18 NOTE — TELEPHONE ENCOUNTER
-Rec'd fax from [facility]  -PA approved from 4/19/18 - 7/18/19  -Pharmacy notified  -PA forms placed in scanning

## 2018-09-04 ENCOUNTER — OFFICE VISIT (OUTPATIENT)
Dept: PSYCHIATRY | Facility: CLINIC | Age: 63
End: 2018-09-04
Attending: PSYCHIATRY & NEUROLOGY
Payer: MEDICARE

## 2018-09-04 VITALS — WEIGHT: 202.3 LBS | DIASTOLIC BLOOD PRESSURE: 81 MMHG | SYSTOLIC BLOOD PRESSURE: 130 MMHG | HEART RATE: 74 BPM

## 2018-09-04 DIAGNOSIS — Z79.899 ENCOUNTER FOR LONG-TERM (CURRENT) USE OF MEDICATIONS: ICD-10-CM

## 2018-09-04 DIAGNOSIS — F11.21 OPIOID DEPENDENCE IN REMISSION (H): ICD-10-CM

## 2018-09-04 PROCEDURE — 80320 DRUG SCREEN QUANTALCOHOLS: CPT | Performed by: PSYCHIATRY & NEUROLOGY

## 2018-09-04 PROCEDURE — 80299 QUANTITATIVE ASSAY DRUG: CPT | Performed by: PSYCHIATRY & NEUROLOGY

## 2018-09-04 PROCEDURE — 80307 DRUG TEST PRSMV CHEM ANLYZR: CPT | Performed by: PSYCHIATRY & NEUROLOGY

## 2018-09-04 PROCEDURE — G0463 HOSPITAL OUTPT CLINIC VISIT: HCPCS | Mod: ZF

## 2018-09-04 RX ORDER — BUPRENORPHINE 2 MG/1
4 TABLET SUBLINGUAL
Qty: 300 TABLET | Refills: 2 | Status: SHIPPED | OUTPATIENT
Start: 2018-09-04 | End: 2018-12-04

## 2018-09-04 ASSESSMENT — PAIN SCALES - GENERAL: PAINLEVEL: SEVERE PAIN (7)

## 2018-09-04 NOTE — PROGRESS NOTES
Progress Notes  Khloe Boyce MD   Psychiatry       PSYCHIATRY VISIT:  The patient was seen for evaluation and management of opioid use disorder.    The patient was seen 3 months ago.  INTERIM HX:  At last visit there was no change.  The patient has been dealing with a lot of pain, torn meniscus in his hip. If he steps wrong, he gets sharp pain. He reports that the tear will be repaired arthroscopically.  There is also a pinched disk in his back that needs surgery but it is higher risk for infection.  His grandkids want him to be physically active but he often can't do it.  IHe is asking about increasing suboxone by 4 mg for pain.  Pain is interfering with sleep and he is tired .  He is used to being more active.      Mood is ok, libido is very low due to medication, he thinks his memory is impaired by the med and thinks it is progressively worse.  Energy  Low. He reports to me that he has some brain atrophy on a previous MRI . Sleep is hard due to pain.  Motivated.     Update: just patient and his wife are in his house.  There are remnants of family still in his place.  He will be receiving help with painting his house through habitat through [employer].     Rare use of BZ and occasional oxycodone.      Recovery continues to be stable, very active in AA.  He has 30 yrs of sobrietyfrom alcohol and 10+ from opioids. He has a regular routine and at every visit shares wisdom from the program.  Finances are  an ongoing source of stress.    He continues to get Rxs for oxycodone for his pain and although it shouldn't have any effect with suboxone on board, he finds that it does. It is being prescribed by his primary. We have discussed this in some detail; as we have the BZ which he uses infrequently. He is very cautious with his meds. He has an agreement with his primary that he will continue to receive monthly prescriptions but if he does not need the pain meds then he shreds the rx.     Last use of drugs was 1988,         SE: sexual dysfunction,  He is comfortable on this dose physically and does not think it possible after 12 yrs of being on the med, to go off even with a slow taper. It has not worked in the past; he becomes too uncomfortable. Sleep wakes many times.    ROS: Pain in his groin and leg and back, remainder of comprehensive neg except as above      MENTAL STATUS EXAM: The patient is neat, on time for appointment. Oriented. Mood is fairly good, affect consistent.  . Thought processes are logical and goal directed. Thought content is normal. Associations intact. Speech RRR, language fluent, concentration good, fund of knowledge good, memory intact, Insight and judgment are good. Gait normal, cognition appears intact but not formally tested.       ASSESSMENT: Opioid dependence, in full remission, on agonist; AIDS, chronic pain, s/p rotator cuff surgery. Venous insufficiency, groin pain, back pain ,S/P DVT    PLAN:   He is asking for an increase in subutex due to increased pain: agreed to increase subutex dose to 20 mg/day to take in divided doses.   While not optimal, the opioid and benzo prescribed by his PCP seems reasonable given his AIDS and his very active recovery program and no signs of misuse.   RTC 3 months,    Drug screen today      Psychiatry Clinic Individual Psychotherapy Note                                                                     [16]   Start time - 140pm        End time - 156pm  Date last reviewed -9-4-18      Date next due -12-4-18    Subjective: This supportive psychotherapy session addressed issues related to current stressors consisting of financial, family.  Patient's reaction: Action in the context of mental status appropriate for ambulatory setting.  Progress: good  Plan: RTC 3mo  Psychotherapy services during this visit included  myself and the patient.   Treatment Plan      SYMPTOMS; PROBLEMS   MEASURABLE GOALS;    FUNCTIONAL IMPROVEMENT INTERVENTIONS;   GAINS MADE DISCHARGE  CRITERIA   Substance Use: opioids   maintaining stable recovery, abstinence, recovery activities building on strengths  communication skills  community/ family support  increase coping skills  medications   self-care skills sustained recovery and medications not needed   Psychosocial: access to healthcare, financial hardship and relationship stress     Physical health: pain   locate resources, information to aid in decision making about health care     Improved pain acceptance of limitations/reality  communication skills  self-care skills, AA program    Appointment with surgeon symptom resolution

## 2018-09-04 NOTE — MR AVS SNAPSHOT
After Visit Summary   2018    Mauro Tamez    MRN: [id number]           Patient Information     Date Of Birth          1955        Visit Information        Provider Department      2018 1:30 PM Khloe Boyce MD Psychiatry Clinic        Today's Diagnoses     Opioid dependence in remission (H)           Follow-ups after your visit        Follow-up notes from your care team     Return in about 3 months (around 2018).      Your next 10 appointments already scheduled     Dec 04, 2018  1:30 PM CST   Adult Med Follow UP with Khloe Boyce MD   Psychiatry Clinic ([facility])    [address]  [address] [id number] [phone]              Who to contact     Please call your clinic at [phone] to:    Ask questions about your health    Make or cancel appointments    Discuss your medicines    Learn about your test results    Speak to your doctor            Additional Information About Your Visit        MyChart Information     FIXOt is an electronic gateway that provides easy, online access to your medical records. With WinWeb, you can request a clinic appointment, read your test results, renew a prescription or communicate with your care team.     To sign up for FIXOt visit the website at [email or web address]   You will be asked to enter the access code listed below, as well as some personal information. Please follow the directions to create your username and password.     Your access code is: [email or web address]  Expires: 2018  1:06 PM     Your access code will  in 90 days. If you need help or a new code, please contact your [facility] or call [phone] for assistance.        Care EveryWhere ID     This is your Care EveryWhere ID. This could be used by other organizations to access your [place] medical records  [id number]        Your Vitals Were     Pulse                    74            Blood Pressure from Last 3 Encounters:   09/04/18 130/81   06/05/18 133/83   03/27/18 133/83    Weight from Last 3 Encounters:   09/04/18 91.8 kg (202 lb 4.8 oz)   06/05/18 93.1 kg (205 lb 3.2 oz)   03/27/18 93.2 kg (205 lb 6.4 oz)              Today, you had the following     No orders found for display         Today's Medication Changes          These changes are accurate as of 9/4/18  5:53 PM.  If you have any questions, ask your nurse or doctor.               These medicines have changed or have updated prescriptions.        Dose/Directions    buprenorphine 2 MG Subl sublingual tablet   Commonly known as:  SUBUTEX   This may have changed:  when to take this   Used for:  Opioid dependence in remission (H)   Changed by:  Khloe Boyce MD        Dose:  4 mg   Place 2 tablets (4 mg) under the tongue 5 times daily   Quantity:  300 tablet   Refills:  2            Where to get your medicines      Some of these will need a paper prescription and others can be bought over the counter.  Ask your nurse if you have questions.     Bring a paper prescription for each of these medications     buprenorphine 2 MG Subl sublingual tablet                Primary Care Provider Office Phone # Fax #    Deann Crandall [phone]       [address] [address]        Equal Access to Services     HASMUKH ROSE AH: Hadii radha uriostegui hadasho Soomaali, waaxda luqadaha, qaybta kaalmada adeyesi, tal kline. So [facility] [phone].    ATENCIÓN: Si habla español, tiene a vernon disposición servicios gratuitos de asistencia lingüística. Llame al [phone].    We comply with applicable federal civil rights laws and [place] laws. We do not discriminate on the basis of race, color, national origin, age, disability, sex, sexual orientation, or gender identity.            Thank you!     Thank you for choosing PSYCHIATRY CLINIC  for your  care. Our goal is always to provide you with excellent care. Hearing back from our patients is one way we can continue to improve our services. Please take a few minutes to complete the written survey that you may receive in the mail after your visit with us. Thank you!             Your Updated Medication List - Protect others around you: Learn how to safely use, store and throw away your medicines at www.disposemymeds.org.          This list is accurate as of 9/4/18  5:53 PM.  Always use your most recent med list.                   Brand Name Dispense Instructions for use Diagnosis    buprenorphine 2 MG Subl sublingual tablet    SUBUTEX    300 tablet    Place 2 tablets (4 mg) under the tongue 5 times daily    Opioid dependence in remission (H)       ISENTRESS PO      Take  by mouth.        naproxen 500 MG tablet    NAPROSYN     Take 500 mg by mouth 2 times daily (with meals). prn        TRUVADA 200-300 MG per tablet   Generic drug:  emtricitabine-tenofovir      Take 1 tablet by mouth daily.

## 2018-09-07 LAB
BUPRENORPHINE GLUCURONIDE URINE: 273 NG/ML
BUPRENORPHINE UR CFM-MCNC: 16 NG/ML
NALOXONE URINE: <100 NG/ML
NORBUPRENORPHINE GLUCURONIDE URINE: 407 NG/ML
NORBUPRENORPHINE UR CFM-MCNC: 101 NG/ML

## 2018-10-16 ENCOUNTER — COMMUNICATION - HEALTHEAST (OUTPATIENT)
Dept: VASCULAR SURGERY | Facility: CLINIC | Age: 63
End: 2018-10-16

## 2018-12-04 ENCOUNTER — OFFICE VISIT (OUTPATIENT)
Dept: PSYCHIATRY | Facility: CLINIC | Age: 63
End: 2018-12-04
Attending: PSYCHIATRY & NEUROLOGY
Payer: MEDICARE

## 2018-12-04 VITALS — HEART RATE: 80 BPM | DIASTOLIC BLOOD PRESSURE: 73 MMHG | WEIGHT: 200.4 LBS | SYSTOLIC BLOOD PRESSURE: 137 MMHG

## 2018-12-04 DIAGNOSIS — F11.21 OPIOID DEPENDENCE IN REMISSION (H): ICD-10-CM

## 2018-12-04 PROCEDURE — G0463 HOSPITAL OUTPT CLINIC VISIT: HCPCS | Mod: ZF

## 2018-12-04 RX ORDER — BUPRENORPHINE 2 MG/1
4 TABLET SUBLINGUAL
Qty: 300 TABLET | Refills: 2 | Status: SHIPPED | OUTPATIENT
Start: 2018-12-04 | End: 2019-02-25

## 2018-12-04 ASSESSMENT — PAIN SCALES - GENERAL: PAINLEVEL: NO PAIN (0)

## 2018-12-04 NOTE — NURSING NOTE
Chief Complaint   Patient presents with     Recheck Medication     Opioid dependence in remission

## 2018-12-04 NOTE — PROGRESS NOTES
Progress Notes  Khloe Boyce MD   Psychiatry       PSYCHIATRY VISIT:  The patient was seen for evaluation and management of opioid use disorder.    The patient was seen 3 months ago.  INTERIM HX: At last visit:   He is asking for an increase in subutex due to increased pain: agreed to increase subutex dose to 20 mg/day to take in divided doses.   While not optimal, the opioid and benzo prescribed by his PCP seems reasonable given his AIDS and his very active recovery program and no signs of misuse.   RTC 3 months,     Drug screen today     The patient is doing well; he is expressing some frustrations with his wife who is ill and with getting his car fixed. Other than that, he is doing ok. He has noticed a decrease in hip pain, not waking as much as night with the increase in Suboxone to 20 mg daily. He has only taken 3 Ativan this past month. He has a regular schedule, sleeps at 11, up at 9.    He takes individuals to meetings but of late, since he has car broken down, relies on others for AA transportation.    His wife is overweight, depressed, inactive, has COPD. She is very resistant to doing anything about her health.  He knows he has to take care of himself regardless of how she is doing.   The patient takes care of himself, he needs to see Dr. Rogers in January. Sleep is pretty good. Energy is ok, up and down, motivated, enjoys things.    He has a home health aid from [place], an agency through [employer] [facility].     Medical HX: Pain, torn meniscus in his hip. If he steps wrong, he gets sharp pain. He reports that the tear will be repaired arthroscopically.  There is also a pinched disk in his back that needs surgery but it is higher risk for infection.  His grandkids want him to be physically active but he often can't do it.    Update: just patient and his wife are in his house.  There are remnants of family still in his place.  He will be receiving help with painting his house through [employer].     Rare use of BZ and occasional oxycodone.      Recovery continues to be stable, very active in AA.  He has 30 yrs of sobrietyfrom alcohol and 10+ from opioids. He has a regular routine and at every visit shares wisdom from the program.  Finances are  an ongoing source of stress.    He continues to get Rxs for oxycodone for his pain and although it shouldn't have any effect with suboxone on board, he finds that it does. It is being prescribed by his primary. We have discussed this in some detail; as we have the BZ which he uses infrequently. He is very cautious with his meds. He has an agreement with his primary that he will continue to receive monthly prescriptions but if he does not need the pain meds then he shreds the rx.     Last use of drugs was 1988,            Current Outpatient Prescriptions:      buprenorphine (SUBUTEX) 2 MG SUBL sublingual tablet, Place 2 tablets (4 mg) under the tongue 5 times daily, Disp: 300 tablet, Rfl: 2     emtricitabine-tenofovir (TRUVADA) 200-300 MG per tablet, Take 1 tablet by mouth daily., Disp: , Rfl:      naproxen (NAPROSYN) 500 MG tablet, Take 500 mg by mouth 2 times daily (with meals). prn, Disp: , Rfl:      Raltegravir Potassium (ISENTRESS PO), Take  by mouth., Disp: , Rfl:      SE: sexual dysfunction,  He is comfortable on this dose physically and does not think it possible after 12 yrs of being on the med, to go off even with a slow taper. It has not worked in the past; he becomes too uncomfortable. Sleep wakes many times.    ROS: Pain in his groin and leg and back, remainder of comprehensive neg except as above      MENTAL STATUS EXAM: The patient is neat, on time for appointment. Oriented. Mood is fairly good, affect consistent.  . Thought processes are logical and goal directed. Thought content is normal. Associations intact. Speech RRR, language fluent, concentration good, fund of knowledge good, memory intact, Insight and judgment are good. Gait  normal, cognition appears intact but not formally tested.       ASSESSMENT: Opioid dependence, in full remission, on agonist; AIDS, chronic pain, s/p rotator cuff surgery. Venous insufficiency, groin pain, back pain ,S/P DVT    PLAN:   No change in meds.  While not optimal, the opioid and benzo prescribed by his PCP seems reasonable given his AIDS and his very active recovery program and no signs of misuse.   RTC 3 months,    Drug screen today      Psychiatry Clinic Individual Psychotherapy Note                                                                     [16]   Start time - 139pm        End time - 156pm  Date last reviewed -9-4-18      Date next due -12-4-18    Subjective: This supportive psychotherapy session addressed issues related to current stressors consisting of financial, family.  Patient's reaction: Action in the context of mental status appropriate for ambulatory setting.  Progress: good  Plan: RTC 3mo  Psychotherapy services during this visit included  myself and the patient.   Treatment Plan      SYMPTOMS; PROBLEMS   MEASURABLE GOALS;    FUNCTIONAL IMPROVEMENT INTERVENTIONS;   GAINS MADE DISCHARGE CRITERIA   Substance Use: opioids   maintaining stable recovery, abstinence, recovery activities building on strengths  communication skills  community/ family support  increase coping skills  medications   self-care skills sustained recovery and medications not needed   Psychosocial: access to healthcare, financial hardship and relationship stress     Physical health: pain   locate resources, information to aid in decision making about health care     Improved pain acceptance of limitations/reality  communication skills  self-care skills, AA program    Appointment with surgeon symptom resolution

## 2019-02-22 ENCOUNTER — TELEPHONE (OUTPATIENT)
Dept: PSYCHIATRY | Facility: CLINIC | Age: 64
End: 2019-02-22

## 2019-02-22 DIAGNOSIS — F11.21 OPIOID DEPENDENCE IN REMISSION (H): ICD-10-CM

## 2019-02-22 NOTE — TELEPHONE ENCOUNTER
Received a phone call from Mauro, stating he received a letter in the mail from [employer] stating his buprenorphone 2mg sublingual tab is still on formulary, but there will be a quantity limit of 90 tabs for 30 days going forward. He asked writer to follow up with [employer] Santos to determine if a PA is needed. Pt states he has used the 8mg tablets before, but he had difficulty cutting them in half. He would prefer to continue to use the 2mg tablets. He last filled the medication on 1/30/19 for a 30-day supply.     Writer placed phone call to [employer] to further inquire. Writer confirmed medication is on formulary, but a quantity limit exception is needed. Initiated the PA via phone, which was approved during the phone call. Clinic will be receiving notification of PA approval via fax.     Case#: [id number]  Effective Dates: 1/1/2019-2/22/2020  PA is for 300 tabs for 30 days.    Placed phone call to pt's pharmacy. They have no refill on file to run in order to verify insurance coverage. The medication was last filled 1/30/19 and no refills remain on the rx. The pt's next appt is 3/5/19, which means he will require one additional refill prior to appointment to prevent gaps in coverage. Will reach out to Dr. Boyce for a refill and loops RNCC Lisa Horner in on request.    Returned phone call to pt. Informed him the PA was approved and that a refill will be submitted once approval is obtained from Dr. Boyce.

## 2019-02-25 RX ORDER — BUPRENORPHINE 2 MG/1
4 TABLET SUBLINGUAL
Qty: 300 TABLET | Refills: 0
Start: 2019-02-25 | End: 2019-03-05

## 2019-02-25 NOTE — TELEPHONE ENCOUNTER
Last seen: 12/4/18  RTC: 3 months   Cancel: none   No-show: none   Next appt: 3/5/19    Incoming refill from patient via phone     Medication requested: buprenorphine (SUBUTEX) 2 MG SUBL sublingual tablet  Directions: Place 2 tablets (4 mg) under the tongue 5 times daily - Sublingual  Qty: 300  Last refilled: 1/30/19, 1/4/19, 12/4/18    Received approval from provider

## 2019-02-25 NOTE — TELEPHONE ENCOUNTER
Khloe Boyce MD   You; Joycelyn Lafleur, RN 19 hours ago (2:23 PM)      Yes, this is fine.   ss    Routing Comment      Meds refilled per providers approval   Med tab changed to reflect this   Called in 30 day supply to pharmacist, Ling   Also confirmed the medication went through the insurance   No further action needed by this writer

## 2019-02-25 NOTE — TELEPHONE ENCOUNTER
Writer placed a call to patient at [phone] to inform the refill was completed. No answer at number provided. LVM, notifying the refill was completed. Clinic number provided for a call back.     No further action needed by this writer

## 2019-03-05 ENCOUNTER — OFFICE VISIT (OUTPATIENT)
Dept: PSYCHIATRY | Facility: CLINIC | Age: 64
End: 2019-03-05
Attending: PSYCHIATRY & NEUROLOGY
Payer: MEDICARE

## 2019-03-05 VITALS — DIASTOLIC BLOOD PRESSURE: 82 MMHG | WEIGHT: 210.6 LBS | HEART RATE: 67 BPM | SYSTOLIC BLOOD PRESSURE: 130 MMHG

## 2019-03-05 DIAGNOSIS — F11.21 OPIOID DEPENDENCE IN REMISSION (H): ICD-10-CM

## 2019-03-05 PROCEDURE — G0463 HOSPITAL OUTPT CLINIC VISIT: HCPCS | Mod: ZF

## 2019-03-05 RX ORDER — BUPRENORPHINE 2 MG/1
4 TABLET SUBLINGUAL
Qty: 300 TABLET | Refills: 1 | Status: SHIPPED | OUTPATIENT
Start: 2019-03-05 | End: 2019-06-03

## 2019-03-05 ASSESSMENT — PAIN SCALES - GENERAL: PAINLEVEL: MODERATE PAIN (5)

## 2019-03-05 ASSESSMENT — PATIENT HEALTH QUESTIONNAIRE - PHQ9: SUM OF ALL RESPONSES TO PHQ QUESTIONS 1-9: 8

## 2019-03-05 NOTE — PROGRESS NOTES
Progress Notes  Khloe Boyce MD   Psychiatry       PSYCHIATRY VISIT:  The patient was seen for evaluation and management of opioid use disorder.    The patient was seen 12-4-18.  INTERIM HX: At last visit:   No change in meds.  While not optimal, the opioid and benzo prescribed by his PCP seems reasonable given his AIDS and his very active recovery program and no signs of misuse.   RTC 3 months,    Drug screen today     Suboxone was increased at last visit to 20 mg/day due to increased pain. This has helped considerably.    Pain is generally manageable but he had a fall and hurt his knee. Lubricant injections help his chronic knee pain.  The extra bup has helped keep the pain at a more even keel.  He does space it out throughout the day. He has only needed 10 tablets of oxycodone for his knee pain.  He is monitored closely by his PCP and has a pain management contract. He is upset over a violation of his confidentiality with a [employer] pharmacy when he went to  his suboxone prescription.  He is planning on contacting the manager..    Mood:  He has been doing ok,The patient is very sensitive to sunlight. Now that we have more light, mood has improved. Anxiety is manageable; he has not needed any ativan this past period.  He is motivated, has interests, enjoyment.     He is having a dental surgery in a week and is asking how to manage the pain with being on suboxone. He is going to receive sedation for it but is not sure what anesthetic they will be using.  We discussed lowering the dose of suboxone to 12 mg/day and increasing the dose of short acting opioid by 2-3x for the few days afterward.  Alternately, he could stay at 20 mg with the same regimen above, knowing he might need a higher dose of oxycodone.     He has a home health aid from [facility], an agency through [employer] [facility].     Medical HX: Pain, torn meniscus in his hip. If he steps wrong, he gets sharp pain. He reports that the tear  will be repaired arthroscopically.  There is also a pinched disk in his back that needs surgery but it is higher risk for infection.      Update: he is frustrated with his daughters and their behaviors.  We did not discuss the challenges of his wife at this visit.      Recovery continues to be stable, very active in AA.  He has 30 yrs of sobrietyfrom alcohol and 10+ from opioids. He has a regular routine and at every visit shares wisdom from the program.  Finances are  an ongoing source of stress.    He continues to get Rxs for oxycodone for his pain and although it shouldn't have any effect with suboxone on board, he finds that it does. It is being prescribed by his primary. We have discussed this in some detail; as we have the BZ which he uses infrequently. He is very cautious with his meds. He has an agreement with his primary that he will continue to receive monthly prescriptions but if he does not need the pain meds then he shreds the rx.     Last use of drugs was 1988,            Current Outpatient Medications:      buprenorphine (SUBUTEX) 2 MG SUBL sublingual tablet, Place 2 tablets (4 mg) under the tongue 5 times daily, Disp: 300 tablet, Rfl: 0     emtricitabine-tenofovir (TRUVADA) 200-300 MG per tablet, Take 1 tablet by mouth daily., Disp: , Rfl:      naproxen (NAPROSYN) 500 MG tablet, Take 500 mg by mouth 2 times daily (with meals). prn, Disp: , Rfl:      Raltegravir Potassium (ISENTRESS PO), Take  by mouth., Disp: , Rfl:      SE: sexual dysfunction,  He is comfortable on this dose physically and does not think it possible after 12 yrs of being on the med, to go off even with a slow taper. It has not worked in the past; he becomes too uncomfortable. Sleep wakes many times.    ROS: Pain in his groin and leg and back and knee,  remainder of comprehensive neg except as above      MENTAL STATUS EXAM: The patient is neat, on time for appointment. Oriented. Mood is fairly good, affect consistent.  . Thought  processes are logical and goal directed. Thought content is normal. Associations intact. Speech RRR, language fluent, concentration good, fund of knowledge good, memory intact, Insight and judgment are good. Gait normal, cognition appears intact but not formally tested.       ASSESSMENT: Opioid dependence, in full remission, on agonist; AIDS, chronic pain, s/p rotator cuff surgery. Venous insufficiency, groin pain, back pain , knee pain, S/P DVT    PLAN:   No change in meds.  While not optimal, the opioid and benzo prescribed by his PCP seems reasonable given his AIDS and his very active recovery program and no signs of misuse.   RTC 3 months,    Drug screen today      Psychiatry Clinic Individual Psychotherapy Note                                                                     [16]   Start time - 139pm        End time - 156pm  Date last reviewed -9-4-18      Date next due -12-4-18    Subjective: This supportive psychotherapy session addressed issues related to current stressors consisting of financial, family.  Patient's reaction: Action in the context of mental status appropriate for ambulatory setting.  Progress: good  Plan: RTC 3mo  Psychotherapy services during this visit included  myself and the patient.   Treatment Plan      SYMPTOMS; PROBLEMS   MEASURABLE GOALS;    FUNCTIONAL IMPROVEMENT INTERVENTIONS;   GAINS MADE DISCHARGE CRITERIA   Substance Use: opioids   maintaining stable recovery, abstinence, recovery activities building on strengths  communication skills  community/ family support  increase coping skills  medications   self-care skills sustained recovery and medications not needed   Psychosocial: access to healthcare, financial hardship and relationship stress     Physical health: pain   locate resources, information to aid in decision making about health care     Improved pain acceptance of limitations/reality  communication skills  self-care skills, AA program    Appointment with surgeon  symptom resolution

## 2019-06-03 DIAGNOSIS — F11.21 OPIOID DEPENDENCE IN REMISSION (H): ICD-10-CM

## 2019-06-03 RX ORDER — BUPRENORPHINE 2 MG/1
4 TABLET SUBLINGUAL
Qty: 300 TABLET | Refills: 0
Start: 2019-06-03 | End: 2019-06-11

## 2019-06-03 NOTE — TELEPHONE ENCOUNTER
Last seen: 3/5/19  RTC: 3 months   Cancel: 6/4/19  No-show: none   Next appt: 6/11/19    Incoming refill from patient via phone     Medication requested: buprenorphine (SUBUTEX) 2 MG SUBL sublingual tablet  Directions: Place 2 tablets (4 mg) under the tongue 5 times daily - Sublingual  Qty: 300  Last refilled: 4/30/19, 4/3/19, 3/5/19     Writer placed a call to patient at [phone] to confirm the regimen of the medication since last refilled on 4/30/19 for a 30 day supply. No answer at number provided. LVM, requesting a call back. Clinic number provided.     Routed to provider for approval

## 2019-06-03 NOTE — TELEPHONE ENCOUNTER
Writer received VORB from provider   1. Refill Subutex 2 mg tab- take 2 tabs five times daily #300     Meds refilled per providers approval   Med tab changed to reflect this   Meds called into pharmacist[facility]   Patient notified of the refill     No further action needed by this writer

## 2019-06-03 NOTE — TELEPHONE ENCOUNTER
[facility] Call Center    Phone Message    May a detailed message be left on voicemail: yes    Reason for Call: Medication Refill Request    Has the patient contacted the pharmacy for the refill? Yes   Name of medication being requested: Suboxone 2mg  Provider who prescribed the medication: Dr Boyce  Pharmacy: [facility]  Date medication is needed: 6/4/19-   Patient has to cancel his 6/4/19 appointment due to his truck's transmition going out. He rescheduled to 6/11/19, but needs his refill on 6/4/19      Action Taken: Routed to nursing pool

## 2019-06-11 ENCOUNTER — OFFICE VISIT (OUTPATIENT)
Dept: PSYCHIATRY | Facility: CLINIC | Age: 64
End: 2019-06-11
Attending: PSYCHIATRY & NEUROLOGY
Payer: MEDICARE

## 2019-06-11 VITALS — SYSTOLIC BLOOD PRESSURE: 126 MMHG | WEIGHT: 205.4 LBS | HEART RATE: 63 BPM | DIASTOLIC BLOOD PRESSURE: 76 MMHG

## 2019-06-11 DIAGNOSIS — F11.21 OPIOID DEPENDENCE IN REMISSION (H): ICD-10-CM

## 2019-06-11 DIAGNOSIS — Z79.899 ENCOUNTER FOR LONG-TERM (CURRENT) USE OF MEDICATIONS: ICD-10-CM

## 2019-06-11 PROCEDURE — G0463 HOSPITAL OUTPT CLINIC VISIT: HCPCS | Mod: ZF

## 2019-06-11 PROCEDURE — 80299 QUANTITATIVE ASSAY DRUG: CPT | Performed by: PSYCHIATRY & NEUROLOGY

## 2019-06-11 PROCEDURE — 80307 DRUG TEST PRSMV CHEM ANLYZR: CPT | Performed by: PSYCHIATRY & NEUROLOGY

## 2019-06-11 PROCEDURE — 80320 DRUG SCREEN QUANTALCOHOLS: CPT | Performed by: PSYCHIATRY & NEUROLOGY

## 2019-06-11 RX ORDER — BUPRENORPHINE 2 MG/1
4 TABLET SUBLINGUAL
Qty: 300 TABLET | Refills: 2 | Status: SHIPPED | OUTPATIENT
Start: 2019-06-11 | End: 2019-09-03

## 2019-06-11 ASSESSMENT — PAIN SCALES - GENERAL: PAINLEVEL: NO PAIN (0)

## 2019-06-11 NOTE — PATIENT INSTRUCTIONS
Thank you for coming to the PSYCHIATRY CLINIC.    Lab Testing:  If you had lab testing today and your results are reassuring or normal they will be mailed to you or sent through [employer] within 7 days.   If the lab tests need quick action we will call you with the results.  The phone number we will call with results is # [phone] (home) . If this is not the best number please call our clinic and change the number.    Medication Refills:  If you need any refills please call your pharmacy and they will contact us. Our fax number for refills is [phone]. Please allow three business for refill processing.   If you need to  your refill at a new pharmacy, please contact the new pharmacy directly. The new pharmacy will help you get your medications transferred.     Scheduling:  If you have any concerns about today's visit or wish to schedule another appointment please call our office during normal business hours [phone] (8-5:00 M-F)    Contact Us:  Please call [phone] during business hours (8-5:00 M-F).  If after clinic hours, or on the weekend, please call  [phone].    Financial Assistance [phone]  [facility]illing [phone]  [employer] Billing [phone]  Medical Records [phone]      MENTAL HEALTH CRISIS NUMBERS:  [facility]:   [facility] - [phone]   Crisis Residence [facility] - [phone]   Walk-In Counseling [facility] [phone]   COPE 24/7 [facility] Team for Adults - [[phone]]; Child - [[phone]]        [facility]:   [facility] - [phone]   Walk-in counseling [facility] - [phone]   Walk-in counseling [facility] - [phone]   Crisis Residence [facility] - [phone]   Urgent Care Adult Mental Health:   --Drop-in, 24/7 crisis line, and [employer] [place] Mobile Team [[phone]]    CRISIS TEXT LINE: Text [phone] from anywhere,  anytime, any crisis 24/7;    OR SEE [email or web address]     Poison Control Center - [phone]    CHILD: [facility] needs assessment team - [phone]     [facility] - [phone]; or [facility] - [phone]    If you have a medical emergency please call 911or go to the nearest ER.                    _____________________________________________    Again thank you for choosing PSYCHIATRY CLINIC and please let us know how we can best partner with you to improve you and your family's health.  You may be receiving a survey in the mail regarding this appointment. We would love to have your feedback, both positive and negative, so please fill out the survey and return it using the provided envelope. The survey is done by an external company, so your answers are anonymous.

## 2019-06-11 NOTE — PROGRESS NOTES
"Progress Notes  Khloe Boyce MD   Psychiatry       PSYCHIATRY VISIT:  The patient was seen for evaluation and management of opioid use disorder.    The patient was seen 3 mo ago  INTERIM HX: At last visit:   No change in meds.  While not optimal, the opioid and benzo prescribed by his PCP seems reasonable given his AIDS and his very active recovery program and no signs of misuse.   RTC 3 months,    Drug screen today     The patient continues to do well. He went to [facility] over Memorial Day weekend and really enjoys it. He brought a man who is frequently relapsing but now is back to work and somewhat better.    Mood is good. He has only used Ativan 3-4x in the last 3 months.  He needs hip surgery but wants to defer until the fall.   He has a pinched nerve in his back but doesn't want surgery.  He goes to AA 2-3x/week and he has a friend Casey who was his sponsee and now is doing well. He just learned that a member of his AA group . He will  others to go to meetings.  He refers to a book \"staying sober and being happy.\"  It emphasizes meditation in the am to connect with someone or reading.    He is having trouble with his daughter. His wife  Is homebound by her choice; she is depressed and has no motivation.    Pain has leveled off with increased dose of subutex.  Worst SE is sexual.  Also feels that he gets forgetful. He has trouble concentrating. He sleeps better without a benzo. He does yard work,   He has a home health aid from [facility], an agency through [employer] [facility].     Medical HX: Pain, torn meniscus in his hip. If he steps wrong, he gets sharp pain. He reports that the tear will be repaired arthroscopically.  There is also a pinched disk in his back that needs surgery but it is higher risk for infection.          Update: he is frustrated with his daughters and their behaviors.  She is a chronic liar and accusatory. Wife remains very depressed.     Recovery continues to be " stable, very active in AA.  He has 30 yrs of sobrietyfrom alcohol and 10+ from opioids. He has a regular routine and at every visit shares wisdom from the program.  Finances are  an ongoing source of stress.    He continues to get Rxs for oxycodone for his pain and although it shouldn't have any effect with suboxone on board, he finds that it does. It is being prescribed by his primary. We have discussed this in some detail; as we have the BZ which he uses infrequently. He is very cautious with his meds. He has an agreement with his primary that he will continue to receive monthly prescriptions but if he does not need the pain meds then he shreds the rx.     Last use of drugs was 1988,          Current Outpatient Medications:      buprenorphine (SUBUTEX) 2 MG SUBL sublingual tablet, Place 2 tablets (4 mg) under the tongue 5 times daily, Disp: 300 tablet, Rfl: 0     emtricitabine-tenofovir (TRUVADA) 200-300 MG per tablet, Take 1 tablet by mouth daily., Disp: , Rfl:      naproxen (NAPROSYN) 500 MG tablet, Take 500 mg by mouth 2 times daily (with meals). prn, Disp: , Rfl:      Raltegravir Potassium (ISENTRESS PO), Take  by mouth., Disp: , Rfl:      SE: sexual dysfunction,  He is comfortable on this dose physically and does not think it possible after 12 yrs of being on the med, to go off even with a slow taper. It has not worked in the past; he becomes too uncomfortable. Sleep wakes many times.    ROS: Pain in his groin and leg and back and knee,  remainder of comprehensive neg except as above      MENTAL STATUS EXAM: The patient is neat, on time for appointment. Oriented. Mood is fairly good, affect consistent.  . Thought processes are logical and goal directed. Thought content is normal. Associations intact. Speech RRR, language fluent, concentration good, fund of knowledge good, memory intact, Insight and judgment are good. Gait normal, cognition appears intact but not formally tested.       ASSESSMENT: Opioid  dependence, in full remission, on agonist; AIDS, chronic pain, s/p rotator cuff surgery. Venous insufficiency, groin pain, back pain , knee pain, S/P DVT    PLAN:   No change in meds.  While not optimal, the opioid and benzo prescribed by his PCP seems reasonable given his AIDS and his very active recovery program and no signs of misuse.   RTC 3 months,    Drug screen today      Psychiatry Clinic Individual Psychotherapy Note                                                                     [16]   Start time - 205pm        End time - 221pm  Date last reviewed -9-4-18      Date next due -9-4-19    Subjective: This supportive psychotherapy session addressed issues related to current stressors consisting of financial, family.  Patient's reaction: Action in the context of mental status appropriate for ambulatory setting.  Progress: good  Plan: RTC 3mo  Psychotherapy services during this visit included  myself and the patient.   Treatment Plan      SYMPTOMS; PROBLEMS   MEASURABLE GOALS;    FUNCTIONAL IMPROVEMENT INTERVENTIONS;   GAINS MADE DISCHARGE CRITERIA   Substance Use: opioids   maintaining stable recovery, abstinence, recovery activities building on strengths  communication skills  community/ family support  increase coping skills  medications   self-care skills sustained recovery and medications not needed   Psychosocial: access to healthcare, financial hardship and relationship stress     Physical health: pain   locate resources, information to aid in decision making about health care     Improved pain acceptance of limitations/reality  communication skills  self-care skills, AA program    Appointment with surgeon symptom resolution

## 2019-06-12 ASSESSMENT — PATIENT HEALTH QUESTIONNAIRE - PHQ9: SUM OF ALL RESPONSES TO PHQ QUESTIONS 1-9: 4

## 2019-06-16 LAB
BUPRENORPHINE GLUCURONIDE URINE: 368 NG/ML
BUPRENORPHINE UR CFM-MCNC: 49 NG/ML
NALOXONE URINE: <100 NG/ML
NORBUPRENORPHINE GLUCURONIDE URINE: 357 NG/ML
NORBUPRENORPHINE UR CFM-MCNC: 96 NG/ML

## 2019-09-03 ENCOUNTER — OFFICE VISIT (OUTPATIENT)
Dept: PSYCHIATRY | Facility: CLINIC | Age: 64
End: 2019-09-03
Attending: PSYCHIATRY & NEUROLOGY
Payer: MEDICARE

## 2019-09-03 VITALS — DIASTOLIC BLOOD PRESSURE: 75 MMHG | HEART RATE: 68 BPM | SYSTOLIC BLOOD PRESSURE: 136 MMHG | WEIGHT: 194.8 LBS

## 2019-09-03 DIAGNOSIS — Z79.899 ENCOUNTER FOR LONG-TERM (CURRENT) USE OF MEDICATIONS: ICD-10-CM

## 2019-09-03 DIAGNOSIS — F11.21 OPIOID DEPENDENCE IN REMISSION (H): ICD-10-CM

## 2019-09-03 LAB
AMPHETAMINES UR QL SCN: NEGATIVE
BARBITURATES UR QL: NEGATIVE
BENZODIAZ UR QL: POSITIVE
CANNABINOIDS UR QL SCN: NEGATIVE
COCAINE UR QL: NEGATIVE
ETHANOL UR QL SCN: NEGATIVE
OPIATES UR QL SCN: NEGATIVE
PCP UR QL SCN: NEGATIVE

## 2019-09-03 PROCEDURE — 80299 QUANTITATIVE ASSAY DRUG: CPT | Performed by: PSYCHIATRY & NEUROLOGY

## 2019-09-03 PROCEDURE — G0463 HOSPITAL OUTPT CLINIC VISIT: HCPCS | Mod: ZF

## 2019-09-03 PROCEDURE — 80307 DRUG TEST PRSMV CHEM ANLYZR: CPT | Performed by: PSYCHIATRY & NEUROLOGY

## 2019-09-03 PROCEDURE — 80320 DRUG SCREEN QUANTALCOHOLS: CPT | Performed by: PSYCHIATRY & NEUROLOGY

## 2019-09-03 RX ORDER — BUPRENORPHINE 2 MG/1
4 TABLET SUBLINGUAL
Qty: 300 TABLET | Refills: 2 | Status: SHIPPED | OUTPATIENT
Start: 2019-09-03 | End: 2019-11-26

## 2019-09-03 ASSESSMENT — PAIN SCALES - GENERAL
PAINLEVEL: NO PAIN (0)
PAINLEVEL: NO PAIN (0)

## 2019-09-03 NOTE — PROGRESS NOTES
"Progress Notes  Khloe Boyce MD   Psychiatry       PSYCHIATRY VISIT:  The patient was seen for evaluation and management of opioid use disorder.    The patient was seen 3 mo ago  INTERIM HX: At last visit:   No change in meds.  While not optimal, the opioid and benzo prescribed by his PCP seems reasonable given his AIDS and his very active recovery program and no signs of misuse.   RTC 3 months,    Drug screen today     The patient continues to do well. He has a new sponsee which keeps him focussed. It is very scary to see the rapid progression of the disease.  He is a very strong proponent of the AA program. He is working with this person on his 4th step and involves his sponsee writing out the step.  The patient has lost 3 sponsees through the years from suicide and liver disease.     The patient reports having major dental problems. Five teeth need to be removed.  He will see an oral surgeon in early October.He found out that one of his AIDS meds likely caused this.  He is asking for a note stating he is on Suboxone and will need a higher dose of opioid for pain management, which I agreed to do.       Mood is good. He has only used one benzo this summer.  He is very cautious, knowing he is on suboxone.  Energy is fair.  Interests and motivation are good.  Sleep is ok.      He needs hip surgery but wants to defer until the fall.   He has a pinched nerve in his back but doesn't want surgery.  He goes to AA 2-3x/week and he has a friend Casey who was his sponsee and now is doing well.  He refers to a book \"staying sober and being happy.\"  It emphasizes meditation in the am to connect with someone or reading.    He is having trouble with his daughter who has a drinking problem.  She has 2 kids but has no relationsnhip with them because of his daughter who has \"thrown him under the bus.\".    His wife will be having a sleep evaluation and is taking some action for her health and seeing a PT but not a psychologist. "  He is frustrated with her resistance and her depression  .     Pain is manageable with the current dose of subutex.  Worst SE is sexual.  Also feels that he gets forgetful. He has trouble concentrating. He sleeps better without a benzo. He does yard work,   He has a home health aid from [employer], an agency through [employer] [facility].     Medical HX: Pain, torn meniscus in his hip. If he steps wrong, he gets sharp pain. He reports that the tear will be repaired arthroscopically.  There is also a pinched disk in his back that needs surgery but it is higher risk for infection.      Update: as above      Recovery continues to be stable, very active in AA.  He has 30 yrs of sobrietyfrom alcohol and 10+ from opioids. He has a regular routine and at every visit shares wisdom from the program.  Finances are  an ongoing source of stress.    He continues to get Rxs for oxycodone for his pain and although it shouldn't have any effect with suboxone on board, he finds that it does. It is being prescribed by his primary. We have discussed this in some detail; as we have the BZ which he uses infrequently. He is very cautious with his meds. He has an agreement with his primary that he will continue to receive monthly prescriptions but if he does not need the pain meds then he shreds the rx.     Last use of drugs was 1988,          Current Outpatient Medications:      buprenorphine (SUBUTEX) 2 MG SUBL sublingual tablet, Place 2 tablets (4 mg) under the tongue 5 times daily, Disp: 300 tablet, Rfl: 2     emtricitabine-tenofovir (TRUVADA) 200-300 MG per tablet, Take 1 tablet by mouth daily., Disp: , Rfl:      naproxen (NAPROSYN) 500 MG tablet, Take 500 mg by mouth 2 times daily (with meals). prn, Disp: , Rfl:      Raltegravir Potassium (ISENTRESS PO), Take  by mouth., Disp: , Rfl:      SE: sexual dysfunction,  He is comfortable on this dose physically and does not think it possible after 12 yrs of being on the med, to go off  even with a slow taper. It has not worked in the past; he becomes too uncomfortable.     ROS: Pain in his groin and leg and back and knee,  remainder of comprehensive neg except as above      MENTAL STATUS EXAM: The patient is neat, on time for appointment. Oriented. Mood is fairly good, affect consistent.  . Thought processes are logical and goal directed. Thought content is normal. Associations intact. Speech RRR, language fluent, concentration good, fund of knowledge good, memory intact, Insight and judgment are good. Gait normal, cognition appears intact but not formally tested.       ASSESSMENT: Opioid dependence, in full remission, on agonist; AIDS, chronic pain, s/p rotator cuff surgery. Venous insufficiency, groin pain, back pain , knee pain, S/P DVT    PLAN:   No change in meds.  While not optimal, the opioid and benzo prescribed by his PCP seems reasonable given his AIDS and his very active recovery program and no signs of misuse.   RTC 3 months,    Drug screen today       Psychiatry Clinic Individual Psychotherapy Note                                                                     [16]   Start time - 140pm        End time - 156pm  Date last reviewed -9-4-18      Date next due -9-4-19    Subjective: This supportive psychotherapy session addressed issues related to current stressors consisting of financial, family.  Patient's reaction: Action in the context of mental status appropriate for ambulatory setting.  Progress: good  Plan: RTC 3mo  Psychotherapy services during this visit included  myself and the patient.   Treatment Plan      SYMPTOMS; PROBLEMS   MEASURABLE GOALS;    FUNCTIONAL IMPROVEMENT INTERVENTIONS;   GAINS MADE DISCHARGE CRITERIA   Substance Use: opioids   maintaining stable recovery, abstinence, recovery activities building on strengths  communication skills  community/ family support  increase coping skills  medications   self-care skills sustained recovery and medications not needed    Psychosocial: access to healthcare, financial hardship and relationship stress     Physical health: pain   locate resources, information to aid in decision making about health care     Improved pain acceptance of limitations/reality  communication skills  self-care skills, AA program    Appointment with surgeon symptom resolution

## 2019-09-06 LAB
BUPRENORPHINE GLUCURONIDE URINE: 855 NG/ML
BUPRENORPHINE UR CFM-MCNC: 38 NG/ML
NALOXONE URINE: <100 NG/ML
NORBUPRENORPHINE GLUCURONIDE URINE: 776 NG/ML
NORBUPRENORPHINE UR CFM-MCNC: 209 NG/ML

## 2019-11-26 ENCOUNTER — OFFICE VISIT (OUTPATIENT)
Dept: PSYCHIATRY | Facility: CLINIC | Age: 64
End: 2019-11-26
Attending: PSYCHIATRY & NEUROLOGY
Payer: MEDICARE

## 2019-11-26 DIAGNOSIS — F11.21 OPIOID DEPENDENCE IN REMISSION (H): ICD-10-CM

## 2019-11-26 RX ORDER — BUPRENORPHINE 8 MG/1
TABLET SUBLINGUAL
Qty: 75 TABLET | Refills: 2 | Status: SHIPPED | OUTPATIENT
Start: 2019-11-26 | End: 2020-02-25

## 2019-11-26 NOTE — PATIENT INSTRUCTIONS
Thank you for coming to the PSYCHIATRY CLINIC.    Lab Testing:  If you had lab testing today and your results are reassuring or normal they will be mailed to you or sent through [employer] within 7 days.   If the lab tests need quick action we will call you with the results.  The phone number we will call with results is # [phone] (home) . If this is not the best number please call our clinic and change the number.    Medication Refills:  If you need any refills please call your pharmacy and they will contact us. Our fax number for refills is [phone]. Please allow three business for refill processing.   If you need to  your refill at a new pharmacy, please contact the new pharmacy directly. The new pharmacy will help you get your medications transferred.     Scheduling:  If you have any concerns about today's visit or wish to schedule another appointment please call our office during normal business hours [phone] (8-5:00 M-F)    Contact Us:  Please call [phone] during business hours (8-5:00 M-F).  If after clinic hours, or on the weekend, please call  [phone].    Financial Assistance [phone]  [place] Billing [phone]  [place] Billing Office, [facility] [place]: [phone]  [place] Billing [phone]  Medical Records [phone]      MENTAL HEALTH CRISIS NUMBERS:  [facility]:   [facility] - [phone]   Crisis Residence [facility] - [phone]   Walk-In Counseling [facility] [phone]   COPE 24/7 [facility] Mobile Team for Adults - [[phone]]; Child - [[phone]]        [facility]:   [facility] - [phone]   Walk-in counseling [facility] - [phone]   Walk-in counseling [facility] - [phone]   Crisis Residence [facility] - [phone]   Urgent Care Adult Mental Health:   --Drop-in, 24/7 crisis line, and [facility] Mobile Team  [[phone]]    CRISIS TEXT LINE: Text [phone] from anywhere, anytime, any crisis 24/7;    OR SEE www.crisistextline.org     Poison Control Center - [phone]    CHILD: [facility] needs assessment team - [phone]     [facility] - [phone]; or [facility] Lif[facility] - [phone]    If you have a medical emergency please call 911or go to the nearest ER.                    _____________________________________________    Again thank you for choosing PSYCHIATRY CLINIC and please let us know how we can best partner with you to improve you and your family's health.  You may be receiving a survey in the mail regarding this appointment. We would love to have your feedback, both positive and negative, so please fill out the survey and return it using the provided envelope. The survey is done by an external company, so your answers are anonymous.

## 2019-11-26 NOTE — PROGRESS NOTES
"Progress Notes  Khloe Boyce MD   Psychiatry       PSYCHIATRY VISIT:  The patient was seen for evaluation and management of opioid use disorder.    The patient was seen 3 mo ago  INTERIM HX: At last visit:   No change in meds.  While not optimal, the opioid and benzo prescribed by his PCP seems reasonable given his AIDS and his very active recovery program and no signs of misuse.   RTC 3 months,    Drug screen today     The patient relates a story of how all of his friends who used drugs have . Addiction has had profound effects on his network.     He had tooth extraction and was very surprised that he only needed 6 opiate pills and used NSAID and Tylenol instead. He was pleased with the outcome and the procedure except for the fact that his insurance did not cover the anesthesia.      The patient is frustrated with the depression in wife. She wants gastric bypass surgery but is unable to follow through with even basic things. She has not left the house for 12 days.     He is doing well, he stays focused, AA 2x/week, he has sponsees and is highly engaged with AA.      No problems with suboxone. Insurance however says he can only get 90 pills/mo.of whichever size.  We will have to make adjustments and he will have to cut his tablet.     He has a pinched nerve in his back but will not be doing surgery.'    He no longer has Ativan and is not interested in any rx.  He has only taken 3 Valium when his back  was in  spasm.      He is having trouble with his daughter who has a drinking problem.  She has 2 kids but has no relationsnhip with them because of his daughter who has \"thrown him under the bus.\".    Pain is manageable with the current dose of subutex.  Worst SE is sexual.  Also feels that he gets forgetful. He has trouble concentrating. He sleeps better without a benzo. He does yard work,   He has a home health aid from [employer], an agency through [employer].     Medical HX: Pain, torn " meniscus in his hip. If he steps wrong, he gets sharp pain.    Update: as above      Recovery continues to be stable, very active in AA.  He has 30 yrs of sobrietyfrom alcohol and 10+ from opioids. He has a regular routine and at every visit shares wisdom from the program.  Last use of drugs was 1988,     Finances are  an ongoing source of stress but he feels fairly stable right now.        Current Outpatient Medications:      buprenorphine (SUBUTEX) 2 MG SUBL sublingual tablet, Place 2 tablets (4 mg) under the tongue 5 times daily, Disp: 300 tablet, Rfl: 2     emtricitabine-tenofovir (TRUVADA) 200-300 MG per tablet, Take 1 tablet by mouth daily., Disp: , Rfl:      naproxen (NAPROSYN) 500 MG tablet, Take 500 mg by mouth 2 times daily (with meals). prn, Disp: , Rfl:      Raltegravir Potassium (ISENTRESS PO), Take  by mouth., Disp: , Rfl:      SE: sexual dysfunction,  He is comfortable on this dose physically and does not think it possible after 12 yrs of being on the med, to go off even with a slow taper. It has not worked in the past; he becomes too uncomfortable.     ROS: Pain in hisleg and back and knee,  remainder of comprehensive neg except as above      MENTAL STATUS EXAM: The patient is neat, on time for appointment. Oriented. Mood is fairly good, affect consistent.  . Thought processes are logical and goal directed. Thought content is normal. Associations intact. Speech RRR, language fluent, concentration good, fund of knowledge good, memory intact, Insight and judgment are good. Gait normal, cognition appears intact but not formally tested.       ASSESSMENT: Opioid dependence, in full remission, on agonist; AIDS, chronic pain, s/p rotator cuff surgery., s/p dental extractions.  Venous insufficiency, groin pain, back pain , knee pain, S/P DVT    PLAN:   No change in dose of meds. However, will use 8 mg tabs:  2.5 daily.   While not optimal, the opioid  prescribed by his PCP seems reasonable given his AIDS  and his very active recovery program and no signs of misuse. The additional opioid is rare.   RTC 3 months,    Drug screen next visit.      Psychiatry Clinic Individual Psychotherapy Note                                                                     [16]   Start time - 140pm        End time - 156pm  Date last reviewed -9-4-18      Date next due -9-4-19    Subjective: This supportive psychotherapy session addressed issues related to current stressors consisting of financial, family.  Patient's reaction: Action in the context of mental status appropriate for ambulatory setting.  Progress: good  Plan: RTC 3mo  Psychotherapy services during this visit included  myself and the patient.   Treatment Plan      SYMPTOMS; PROBLEMS   MEASURABLE GOALS;    FUNCTIONAL IMPROVEMENT INTERVENTIONS;   GAINS MADE DISCHARGE CRITERIA   Substance Use: opioids   maintaining stable recovery, abstinence, recovery activities building on strengths  communication skills  community/ family support  increase coping skills  medications   self-care skills sustained recovery and medications not needed   Psychosocial: access to healthcare, financial hardship and relationship stress     Physical health: pain   locate resources, information to aid in decision making about health care     Improved pain acceptance of limitations/reality  communication skills  self-care skills, AA program    Appointment with surgeon symptom resolution

## 2019-11-27 ENCOUNTER — TELEPHONE (OUTPATIENT)
Dept: PSYCHIATRY | Facility: CLINIC | Age: 64
End: 2019-11-27

## 2019-11-27 NOTE — TELEPHONE ENCOUNTER
Prior Authorization Approval    Authorization Effective Date: 8/29/2019  Authorization Expiration Date: 11/26/2020  Medication: buprenorphine (SUBUTEX) 8 MG SUBL sublingual tablet- APPROVED  Approved Dose/Quantity:  Reference #:     Insurance Company: Tali Lee - Phone [phone] Fax [phone]  Expected CoPay:       CoPay Card Available:      Foundation Assistance Needed:    Which Pharmacy is filling the prescription (Not needed for infusion/clinic administered): [id number] - [place], [place] - [facility] [facility]  Pharmacy Notified: Yes-Pharmacy will be calling patient as he was wanting the 2 mg tablets but pharmacy stated patient probably didn't realize this is the new dose.  Patient Notified: No

## 2019-11-27 NOTE — TELEPHONE ENCOUNTER
Central Prior Authorization Team   Phone: [phone]      PA Initiation    Medication: buprenorphine (SUBUTEX) 8 MG SUBL sublingual tablet-   Insurance Company: [employer] - Phone [phone] Fax [phone]  Pharmacy Filling the Rx: [id number] - [place], [place] - [address] AT [facility] [address]  Filling Pharmacy Phone: [phone]  Filling Pharmacy Fax:    Start Date: 11/27/2019

## 2019-11-27 NOTE — TELEPHONE ENCOUNTER
Prior Authorization Retail Medication Request    Medication/Dose: buprenorphine (SUBUTEX) 8 MG SUBL sublingual tablet-  Take 2 1/2 tabs SL daily  ICD code (if different than what is on RX):  Opioid dependence in remission (H) [F11.21]   Previously Tried and Failed:  Renewal   Rationale:  There are no other medication used for Opoid dependence maintenance therapy. Patient has been on this medication since at least 2016. Patient is stable on this medication and dose and has been able to abstain from alcohol and drug use. Patient will experience withdrawal without this medication and will put him at risk for relapse. We are requesting an expedited review in the matter to avoid any further interruption in Mauro being able to receive this medication. Please feel free to contact our clinic with any further questions.      Insurance Name:  Not provided   Insurance ID:  Not provided       Pharmacy Information (if different than what is on RX)  Name:  Same   Phone:  Same

## 2020-02-25 ENCOUNTER — OFFICE VISIT (OUTPATIENT)
Dept: PSYCHIATRY | Facility: CLINIC | Age: 65
End: 2020-02-25
Attending: PSYCHIATRY & NEUROLOGY
Payer: MEDICARE

## 2020-02-25 ENCOUNTER — TELEPHONE (OUTPATIENT)
Dept: PSYCHIATRY | Facility: CLINIC | Age: 65
End: 2020-02-25

## 2020-02-25 VITALS — DIASTOLIC BLOOD PRESSURE: 79 MMHG | WEIGHT: 207.2 LBS | HEART RATE: 78 BPM | SYSTOLIC BLOOD PRESSURE: 137 MMHG

## 2020-02-25 DIAGNOSIS — F11.21 OPIOID DEPENDENCE IN REMISSION (H): ICD-10-CM

## 2020-02-25 PROCEDURE — G0463 HOSPITAL OUTPT CLINIC VISIT: HCPCS | Mod: ZF

## 2020-02-25 RX ORDER — BUPRENORPHINE 2 MG/1
20 TABLET SUBLINGUAL DAILY
Qty: 300 TABLET | Refills: 2 | Status: SHIPPED | OUTPATIENT
Start: 2020-02-25 | End: 2020-05-19

## 2020-02-25 ASSESSMENT — PAIN SCALES - GENERAL: PAINLEVEL: NO PAIN (0)

## 2020-02-25 NOTE — PATIENT INSTRUCTIONS
Thank you for coming to the PSYCHIATRY CLINIC.    Lab Testing:  If you had lab testing today and your results are reassuring or normal they will be mailed to you or sent through [employer] within 7 days.   If the lab tests need quick action we will call you with the results.  The phone number we will call with results is # [phone] (home) . If this is not the best number please call our clinic and change the number.    Medication Refills:  If you need any refills please call your pharmacy and they will contact us. Our fax number for refills is [phone]. Please allow three business for refill processing.   If you need to  your refill at a new pharmacy, please contact the new pharmacy directly. The new pharmacy will help you get your medications transferred.     Scheduling:  If you have any concerns about today's visit or wish to schedule another appointment please call our office during normal business hours [phone] (8-5:00 M-F)    Contact Us:  Please call [phone] during business hours (8-5:00 M-F).  If after clinic hours, or on the weekend, please call  [phone].    Financial Assistance [phone]  [facility] Billing [phone]  [place] Billing Office, [facility]: [phone]  [place] Billing [phone]  Medical Records [phone]      MENTAL HEALTH CRISIS NUMBERS:  [facility]:   [facility] - [phone]   Crisis Residence [facility] - [phone]   Walk-In Counseling [facility] [phone]   COPE 24/7 [place] Mobile Team for Adults - [[phone]]; Child - [[phone]]        [facility]:   [facility] - [phone]   Walk-in counseling [facility] - [phone]   Walk-in counseling [facility] - [phone]   Crisis Residence [facility] - [phone]   Urgent Care Adult Mental Health:   --Drop-in, 24/7 crisis line, and [facility] Mobile Team  [[phone]]    CRISIS TEXT LINE: Text [phone] from anywhere, anytime, any crisis 24/7;    OR SEE www.crisistextline.org     National Suicide Prevention Lifeline: 410-912-LEKQ (150-127-7756)    Poison Control Center - [phone]    CHILD: [facility] needs assessment team - [phone]     [facility] Lifeline - [phone]; or [facility] Lifeline - [phone]    If you have a medical emergency please call 911or go to the nearest ER.                    _____________________________________________    Again thank you for choosing PSYCHIATRY CLINIC and please let us know how we can best partner with you to improve you and your family's health.  You may be receiving a survey regarding this appointment. We would love to have your feedback, both positive and negative. The survey is done by an external company, so your answers are anonymous.

## 2020-02-25 NOTE — TELEPHONE ENCOUNTER
"Patient last seen in clinic on 2/25/20 and medications were refilled by provider. Pharmacy is calling to confirm the dose.     Per med tab:  buprenorphine (SUBUTEX) 2 MG SUBL sublingual tablet 300 tablet 2 2/25/2020  No   Sig - Route: Place 10 tablets (20 mg) under the tongue daily Take 2 1/2 tabs SL daily - Sublingual   Sent to pharmacy as: buprenorphine (SUBUTEX) 2 MG SUBL sublingual tablet   Class: E-Prescribe   Notes to Pharmacy: VICKEY [id number]   Order: [id number]   E-Prescribing Status: Receipt confirmed by pharmacy (2/25/2020  3:28 PM CST)     Per last office visit note:   \"No change in dose of meds. However, will use 2 mg tabs: 10/daily\"    - Routed to provider to confirm the dose     "

## 2020-02-25 NOTE — TELEPHONE ENCOUNTER
[facility] Call Center    Phone Message    May a detailed message be left on voicemail: yes     Reason for Call: Medication Question or concern regarding medication   Prescription Clarification  Name of Medication: subutex  Prescribing Provider: Austen    Pharmacy: [facility][id number] - [place], [place] - [address] AT [facility] [address]     What on the order needs clarification?  They need clarification on the dosage instructions re number of tables per day          Action Taken: Other: nusring pool    Travel Screening: Not Applicable

## 2020-02-25 NOTE — PROGRESS NOTES
"Progress Notes  Khloe Boyce MD   Psychiatry       PSYCHIATRY VISIT:  The patient was seen for evaluation and management of opioid use disorder.    The patient was seen 3 mo ago  INTERIM HX: At last visit:   No change in meds.  While not optimal, the opioid and benzo prescribed by his PCP seems reasonable given his AIDS and his very active recovery program and no signs of misuse.   RTC 3 months,    Drug screen today     The patient would like to take 2 mg tabs instead of trying to cut the 8's. He splits the tabs but they don't break evenly.  He received approval for this.    He remains very active in recovery and he and others brought a meeting to his sponsee who had surgery and was at home.    Except for missing his grandkids, he is fine.  It is tragic because they are being withheld by his daughter.  Another problem is his wife who is very depressed and has health problems.    \  The patient takes the same meds; the only exception was acute exacerbation of pain and received oxy and valium briefly.        Sleep is fair, energy low, wintertime \"blues\" Noted decreased concentration, interests. He notes decline in memory , gradually.  He makes sure he leaves his house at least 3-4x/week.     The patient 's wife wants gastric bypass surgery but is unable to follow through with even basic things.     He is doing well, he stays focused, AA 2x/week, he has sponsees and is highly engaged with AA.      No problems with suboxone.     He has a pinched nerve in his back but will not be doing surgery.'    He no longer has Ativan and is not interested in any rx.      He is having trouble with his daughter who has a drinking problem.  She has 2 kids but has no relationsnhip with them because of his daughter who has \"thrown him under the bus.\".He knows his grandson is having behavior problems but his daughter is unwilling to talk to him.     Pain is manageable with the current dose of subutex.  Worst SE is sexual.  Also " feels that he gets forgetful. He has trouble concentrating. He sleeps better without a benzo.   He has a home health aid from [employer], an agency through [employer].     Medical HX: Pain, torn meniscus in his hip. If he steps wrong, he gets sharp pain.    Update: as above      Recovery continues to be stable, very active in AA.  He has 30 yrs of sobrietyfrom alcohol and 10+ from opioids. He has a regular routine and at every visit shares wisdom from the program.  Last use of drugs was 1988,     Finances are  an ongoing source of stress but he feels fairly stable right now.        Current Outpatient Medications:      buprenorphine (SUBUTEX) 8 MG SUBL sublingual tablet, Take 2 1/2 tabs SL daily, Disp: 75 tablet, Rfl: 2     emtricitabine-tenofovir (TRUVADA) 200-300 MG per tablet, Take 1 tablet by mouth daily., Disp: , Rfl:      naproxen (NAPROSYN) 500 MG tablet, Take 500 mg by mouth 2 times daily (with meals). prn, Disp: , Rfl:      Raltegravir Potassium (ISENTRESS PO), Take  by mouth., Disp: , Rfl:      SE: sexual dysfunction,  He is comfortable on this dose physically and does not think it possible after 12 yrs of being on the med, to go off even with a slow taper. It has not worked in the past; he becomes too uncomfortable.     ROS: Pain in his leg and back and knee are up and down but better today.  He will get lub injections. ,  remainder of comprehensive neg except as above      MENTAL STATUS EXAM: The patient is neat, on time for appointment. Oriented. Mood is fairly good, affect consistent.  . Thought processes are logical and goal directed. Thought content is normal. Associations intact. Speech RRR, language fluent, concentration good, fund of knowledge good, memory intact, Insight and judgment are good. Gait normal, cognition appears intact but not formally tested.       ASSESSMENT: Opioid dependence, in full remission, on agonist; AIDS, chronic pain, s/p rotator cuff surgery., s/p dental  extractions.  Venous insufficiency, groin pain, back pain , knee pain, S/P DVT    PLAN:   No change in dose of meds. However, will use 2 mg tabs: 10/daily.   The opioid  prescribed by his PCP seems reasonable given his AIDS and his very active recovery program and no signs of misuse. The additional opioid is rare.   RTC 3 months,    Drug screen next visit.      Psychiatry Clinic Individual Psychotherapy Note                                                                     [16]   Start time - 140pm        End time - 156pm  Date last reviewed -2-24-20      Date next due -5-24-20    Subjective: This supportive psychotherapy session addressed issues related to current stressors consisting of financial, family.  Patient's reaction: Action in the context of mental status appropriate for ambulatory setting.  Progress: good  Plan: RTC 3mo  Psychotherapy services during this visit included  myself and the patient.   Treatment Plan      SYMPTOMS; PROBLEMS   MEASURABLE GOALS;    FUNCTIONAL IMPROVEMENT INTERVENTIONS;   GAINS MADE DISCHARGE CRITERIA   Substance Use: opioids   maintaining stable recovery, abstinence, recovery activities building on strengths  communication skills  community/ family support  increase coping skills  medications   self-care skills sustained recovery and medications not needed   Psychosocial: access to healthcare, financial hardship and relationship stress     Physical health: pain   locate resources, information to aid in decision making about health care     Improved pain acceptance of limitations/reality  communication skills  self-care skills, AA program    Appointment with surgeon symptom resolution

## 2020-02-26 NOTE — TELEPHONE ENCOUNTER
Medication Question (Subutex )     Khloe Boyce MD  You 9 hours ago (11:28 PM)      To clarify; patient wanted to use the 2 mg tabs since he takes them several times a day and the total dose is 20 mg/day.  Previously he had the 8 mg and he had to cut them which didn't work so well.     Yes, thirty day supply and 2 refills.   Thanks,   SS    Routing comment      Writer placed a call to [id number] - [place] [id number] AT [facility] [address] and spoke with pharmacist, Arely and relayed the current dose of subutex patient should be taking. Updated her that patient is prescribed buprenorphine (SUBUTEX) 2 MG SUBL sublingual tablet- Place 10 tablets (20 mg) under the tongue daily #300 with 2 refills. She verbalized understanding.     No further action needed by this writer

## 2020-02-27 ENCOUNTER — TELEPHONE (OUTPATIENT)
Dept: PSYCHIATRY | Facility: CLINIC | Age: 65
End: 2020-02-27

## 2020-02-27 NOTE — TELEPHONE ENCOUNTER
Central Prior Authorization Team   Phone: [phone]      PA Initiation    Medication: buprenorphine (SUBUTEX) 2 MG SUBL sublingual tablet  Insurance Company: [employer] - Phone [phone] Fax [phone]  Pharmacy Filling the Rx: [facility] #[id number] - [place], [place] - [address] AT [facility] [address]  Filling Pharmacy Phone: [phone]  Filling Pharmacy Fax:    Start Date: 2/27/2020

## 2020-02-27 NOTE — TELEPHONE ENCOUNTER
Prior Authorization Approval    Authorization Effective Date: 1/20/2020  Authorization Expiration Date: 2/26/2021  Medication: buprenorphine (SUBUTEX) 2 MG SUBL sublingual tablet-APPROVED  Approved Dose/Quantity:   Reference #:     Insurance Company: Tali Lee - Phone [phone] Fax [phone]  Expected CoPay:       CoPay Card Available:      Foundation Assistance Needed:    Which Pharmacy is filling the prescription (Not needed for infusion/clinic administered): [facility] #[id number] - [place], [place] - [facility] AT [facility]  Pharmacy Notified: Yes-Pharmacy will notify patient when ready ASAP  Patient Notified: No

## 2020-02-27 NOTE — TELEPHONE ENCOUNTER
Prior Authorization Retail Medication Request     Medication/Dose: buprenorphine (SUBUTEX) 2 MG SUBL sublingual tablet     ICD code: Opioid dependence in remission (H) [F11.21]      Previously Tried and Failed: Renewal     Rationale: There are no other medication used for Opoid dependence maintenance therapy. Patient has been on this medication since at least . Patient is stable on this medication and has been able to abstain from alcohol and drug use. Patient will experience withdrawal without this medication and will put him at risk for relapse. We are requesting an expedited review in the matter to avoid any further interruption in Mauro being able to receive this medication. Please feel free to contact our clinic with any further questions.       Insurance Name: not provided  Insurance ID:  not provided        Pharmacy Information (if different than what is on RX)  Name:  [facility]   Phone:  [phone]  Fax:  [phone]     Key: [id number]  Patient name: Mauro Tamez  : [birth year]

## 2020-05-19 ENCOUNTER — VIRTUAL VISIT (OUTPATIENT)
Dept: PSYCHIATRY | Facility: CLINIC | Age: 65
End: 2020-05-19
Attending: PSYCHIATRY & NEUROLOGY
Payer: MEDICARE

## 2020-05-19 DIAGNOSIS — F11.21 OPIOID DEPENDENCE IN REMISSION (H): ICD-10-CM

## 2020-05-19 RX ORDER — BUPRENORPHINE 2 MG/1
20 TABLET SUBLINGUAL DAILY
Qty: 300 TABLET | Refills: 2 | Status: SHIPPED | OUTPATIENT
Start: 2020-05-19 | End: 2020-05-22

## 2020-05-21 ENCOUNTER — TELEPHONE (OUTPATIENT)
Dept: PSYCHIATRY | Facility: CLINIC | Age: 65
End: 2020-05-21

## 2020-05-21 DIAGNOSIS — F11.21 OPIOID DEPENDENCE IN REMISSION (H): ICD-10-CM

## 2020-05-21 NOTE — TELEPHONE ENCOUNTER
Received incoming call from pharmacy looking for clarification on instructions for buprenorphine (SUBUTEX) 2 MG SUBL sublingual tablet. Current sig states:    Place 10 tablets (20 mg) under the tongue daily Take 2 1/2 tabs SL daily - Sublingual    Pharmacy also wants provider to be aware that patient was last dispensed this medication on 4/29 for a 30-day supply. Pharmacy looking for clarification on start date as it is a controlled substance.     Writer will reach out to the provider to clarify.

## 2020-05-22 RX ORDER — BUPRENORPHINE 2 MG/1
20 TABLET SUBLINGUAL DAILY
Qty: 300 TABLET | Refills: 2
Start: 2020-05-26 | End: 2020-08-10

## 2020-08-10 DIAGNOSIS — F11.21 OPIOID DEPENDENCE IN REMISSION (H): ICD-10-CM

## 2020-08-10 NOTE — TELEPHONE ENCOUNTER
[facility] Call Center    Phone Message    May a detailed message be left on voicemail: yes     Reason for Call: Medication Refill Request    Has the patient contacted the pharmacy for the refill? Yes   Name of medication being requested: buprenorphine  Provider who prescribed the medication: Khloe Boyce MD  Pharmacy: [id number] - [place], [place] - [address]  Date medication is needed: 8/26/20        Action Taken: Message routed to:  Other: Nursing pool    Travel Screening: Not Applicable

## 2020-08-10 NOTE — TELEPHONE ENCOUNTER
Last seen: 5/19  RTC: 3 months   Cancel: 6/2  No-show: none   Next appt: 9/1    Incoming refill from patient via phone     Medication requested: buprenorphine (SUBUTEX) 2 MG SUBL sublingual tablet  Directions: Place 10 tablets (20 mg) under the tongue daily Clarification 5/22/2020: Take 2 tablets (4 mg) sublingual 5 times daily. Total daily dose 10 tablets (20 mg). - Sublingual  Qty: 300  Last refilled: 7/26 #300, 6/29 #300, 5/26 #300    Will route to provider for approval with start date of 8/26

## 2020-08-12 RX ORDER — BUPRENORPHINE 2 MG/1
20 TABLET SUBLINGUAL DAILY
Qty: 300 TABLET | Refills: 0 | Status: SHIPPED | OUTPATIENT
Start: 2020-08-26 | End: 2020-09-01

## 2020-08-12 NOTE — TELEPHONE ENCOUNTER
- Meds refilled by Dr. Boyce   - Med tab changed to reflect this   - Patient notified     No further action needed by this writer

## 2020-09-01 ENCOUNTER — VIRTUAL VISIT (OUTPATIENT)
Dept: PSYCHIATRY | Facility: CLINIC | Age: 65
End: 2020-09-01
Attending: PSYCHIATRY & NEUROLOGY
Payer: MEDICARE

## 2020-09-01 ENCOUNTER — TELEPHONE (OUTPATIENT)
Dept: PSYCHIATRY | Facility: CLINIC | Age: 65
End: 2020-09-01

## 2020-09-01 DIAGNOSIS — F11.21 OPIOID DEPENDENCE IN REMISSION (H): ICD-10-CM

## 2020-09-01 RX ORDER — BUPRENORPHINE 2 MG/1
20 TABLET SUBLINGUAL DAILY
Qty: 300 TABLET | Refills: 2 | Status: SHIPPED | OUTPATIENT
Start: 2020-09-01 | End: 2020-12-01

## 2020-09-01 ASSESSMENT — PAIN SCALES - GENERAL: PAINLEVEL: SEVERE PAIN (6)

## 2020-09-01 NOTE — TELEPHONE ENCOUNTER
On 09/01/2020, at 1539, writer called patient at [phone] to confirm Virtual Visit. Writer unable to make contact with patient. Writer left detailed voice message for call back. [phone] left as call back number. Berenice Price MA

## 2020-09-01 NOTE — PROGRESS NOTES
Progress Notes   Khloe Boyce MD   Psychiatry       PSYCHIATRY VISIT    TELEPHONE VISIT  Mauro Tamez is a 64 year old pt. who is being evaluated via a billable telephone visit.      The patient has been notified of the following:    We have found that certain health care needs can be provided without the need for a physical exam. This service lets us provide the care you need with a short phone conversation. If a prescription is necessary we can send it directly to your pharmacy. If lab work is needed we can place an order for that and you can then stop by our lab to have the test done at a later time. Insurers are generally covering virtual visits as they would in-office visits so billing should not be different than normal.  If for some reason you do get billed incorrectly, you should contact the billing office to correct it and that number is in the AVS .    Patient has given verbal consent for a telephone visit?:y  How would the pt like to obtain the AVS?: declined  AVS SmartPhrase [PsychAVS] has been placed in 'Patient Instructions': na    Start Time:  410PM          End Time:  435pm    The patient was seen for evaluation and management of opioid use disorder.    The patient was last seen 5-19-20  INTERIM HX: At last visit:   No change in dose of meds. However, will use 2 mg tabs: 10/daily.   The opioid  prescribed by his PCP seems reasonable given his AIDS and his very active recovery program and no signs of misuse. The additional opioid is rare.   RTC 3 months,        Since his last visit, he has been doing well.   He tries to stay healthy. He is praying the election will go the way it needs to be.     Energy is ok but he gets fatigued easily.  He had surgery on his ehe;  It was a chalazion.  It was very painful but now is getting better.      He misses his grandkids but his daughter won't talk to him.  She will call the police.    He is very sad that he doesn;'t have a connection with  "them.    Patient wakes with a resentment  And has to deal with it daily.     He does \"the next right thing.\" This is his philosophy and has been a source of peace and serenity.     He has been doing many zoom mtgs for AA.  \"Living in the solution\" is one such group.  He is quite connected with AA.  He misses helping newcomers; he lost contact with them at the beginning of covid.  Living one day at a time.  He will be meeting in person with one person.  He has N95 masks now . He doesn't go to the store frequently and he stays out of public areas.  His brother in law just  from covid last week.  He was in a nursing home.  He and his wife has not seen him for 30 yrs.  They received some family hx which will be helpful.       His health has been good as has his wife.  Energy is ok, they both take care of themselves, sleep ok.  Today, he is tired from mowing the lawn.      Previously he reported  missing his grandkids.  It is tragic because they are being withheld by his daughter.  Another problem is his wife who is very depressed and has health problems.    \  The patient takes the same meds    Sleep is ok, energy fairly ok.     No problems with subutex     He has a pinched nerve in his back but will not be doing surgery.' Today his back is bothing him todahy.   On and off i  is something he just deals with dailhy.    He no longer has Ativan and is not interested in any rx.      Previously he reported that  his daughter had a drinking problem.  She has 2 kids but has no relationsnhip with them because of his daughter who has \"thrown him under the bus.\".He knows his grandson is having behavior problems but his daughter is unwilling to talk to him.     Pain is manageable with the current dose of subutex.  Worst SE is sexual.  Also feels that he gets forgetful. He has trouble concentrating. He sleeps better without a benzo.   He has a home health aid from [employer], an agency through [employer] [facility].     Medical " HX: Pain, torn meniscus in his hip. If he steps wrong, he gets sharp pain.    Update: as above      Recovery continues to be stable, very active in AA.  He has 30 yrs of sobrietyfrom alcohol and 10+ from opioids. He has a regular routine and at every visit shares wisdom from the program.  Last use of drugs was 1988,     Finances are  an ongoing source of stress but he feels fairly stable right now.        Current Outpatient Medications:      buprenorphine (SUBUTEX) 2 MG SUBL sublingual tablet, Place 10 tablets (20 mg) under the tongue daily, Disp: 300 tablet, Rfl: 0     emtricitabine-tenofovir (TRUVADA) 200-300 MG per tablet, Take 1 tablet by mouth daily., Disp: , Rfl:      naproxen (NAPROSYN) 500 MG tablet, Take 500 mg by mouth 2 times daily (with meals). prn, Disp: , Rfl:      Raltegravir Potassium (ISENTRESS PO), Take  by mouth., Disp: , Rfl:      He takes the Subutex spread out during the day.   SE: sexual dysfunction,  He is comfortable on this dose physically and does not think it possible after 12 yrs of being on the med, to go off even with a slow taper. It has not worked in the past; he becomes too uncomfortable.     ROS: Pain in his leg and back and knee are up and down, remainder of comprehensive neg except as above      MENTAL STATUS EXAM: The patient interview is via phone appointment. Oriented. Mood is good, affect consistent.  . Thought processes are logical and goal directed. Thought content is normal. Associations intact. Speech RRR, language fluent, concentration good, fund of knowledge good, memory intact, Insight and judgment are good. Gait n/a, cognition appears intact but not formally tested.       ASSESSMENT: Opioid dependence, in full remission, on agonist; AIDS, chronic pain, s/p rotator cuff surgery., s/p dental extractions.  Venous insufficiency, groin pain, back pain , knee pain, S/P DVT    PLAN:   No change in dose of subutex . RTC 3 months,    Drug screen not done., due to remote  visit    Psychiatry Clinic Individual Psychotherapy Note                                                                     [16]   Start time - 430pm        End time - 446pm  Date last reviewed -2-24-20      Date next due -5-24-20    Subjective: This supportive psychotherapy session addressed issues related to current stressors consisting of financial, family.  Patient's reaction: Action in the context of mental status appropriate for ambulatory setting.  Progress: good  Plan: RTC 3mo  Psychotherapy services during this visit included  myself and the patient.   Treatment Plan      SYMPTOMS; PROBLEMS   MEASURABLE GOALS;    FUNCTIONAL IMPROVEMENT INTERVENTIONS;   GAINS MADE DISCHARGE CRITERIA   Substance Use: opioids   maintaining stable recovery, abstinence, recovery activities building on strengths  communication skills  community/ family support  increase coping skills  medications   self-care skills sustained recovery and medications not needed   Psychosocial: access to healthcare, financial hardship and relationship stress     Physical health: pain   locate resources, information to aid in decision making about health care     Improved pain acceptance of limitations/reality  communication skills  self-care skills, AA program    Appointment with surgeon symptom resolution

## 2020-09-01 NOTE — PROGRESS NOTES
"VIDEO VISIT  Mauro Tamez is a 64 year old patient who is being evaluated via a billable video visit.      The patient has been notified of following:   \"This video visit will be conducted via a call between you and your physician/provider. We have found that certain health care needs can be provided without the need for an in-person physical exam. This service lets us provide the care you need with a video conversation. If a prescription is necessary we can send it directly to your pharmacy. If lab work is needed we can place an order for that and you can then stop by our lab to have the test done at a later time. Insurers are generally covering virtual visits as they would in-office visits so billing should not be different than normal.  If for some reason you do get billed incorrectly, you should contact the billing office to correct it and that number is in the AVS .    Video Conference to be completed via:  [email or web address]    Patient has given verbal consent for video visit?:  Yes    Patient would prefer that any video invitations be sent by: Text to cell phone: [phone]      How would patient like to obtain AVS?:  Mail a copy    AVS SmartPhrase [PsychAVS] has been placed in 'Patient Instructions':  Yes    "

## 2020-12-01 ENCOUNTER — VIRTUAL VISIT (OUTPATIENT)
Dept: PSYCHIATRY | Facility: CLINIC | Age: 65
End: 2020-12-01
Attending: PSYCHIATRY & NEUROLOGY
Payer: MEDICARE

## 2020-12-01 DIAGNOSIS — F11.21 OPIOID DEPENDENCE IN REMISSION (H): ICD-10-CM

## 2020-12-01 PROCEDURE — 90833 PSYTX W PT W E/M 30 MIN: CPT | Mod: 95 | Performed by: PSYCHIATRY & NEUROLOGY

## 2020-12-01 PROCEDURE — 99214 OFFICE O/P EST MOD 30 MIN: CPT | Mod: 95 | Performed by: PSYCHIATRY & NEUROLOGY

## 2020-12-01 RX ORDER — BUPRENORPHINE 2 MG/1
20 TABLET SUBLINGUAL DAILY
Qty: 300 TABLET | Refills: 2 | Status: SHIPPED | OUTPATIENT
Start: 2020-12-01 | End: 2021-03-09

## 2020-12-01 ASSESSMENT — PAIN SCALES - GENERAL: PAINLEVEL: MODERATE PAIN (5)

## 2020-12-01 NOTE — PROGRESS NOTES
"VIDEO VISIT  Mauro Tamez is a 65 year old patient who is being evaluated via a billable video visit.      The patient has been notified of following:   \"This video visit will be conducted via a call between you and your physician/provider. We have found that certain health care needs can be provided without the need for an in-person physical exam. This service lets us provide the care you need with a video conversation. If a prescription is necessary we can send it directly to your pharmacy. If lab work is needed we can place an order for that and you can then stop by our lab to have the test done at a later time. Insurers are generally covering virtual visits as they would in-office visits so billing should not be different than normal.  If for some reason you do get billed incorrectly, you should contact the billing office to correct it and that number is in the AVS .    Video Conference to be completed via:  [email or web address]    Patient has given verbal consent for video visit?:  Yes    Patient would prefer that any video invitations be sent by: Send to e-mail at: [email or web address]      How would patient like to obtain AVS?:  Patient declined    AVS SmartPhrase [PsychAVS] has been placed in 'Patient Instructions':  Yes     Video- Visit Details  Type of service:  video visit for medication management  Time of service:    Date:  12/01/2020     Video Start Time:  3:59 PM        Video End Time:  430pm    Reason for video visit:  Patient unable to travel due to Covid-19  Originating Site (patient location):  [facility] [place]   Location- Patient's home  Distant Site (provider location):  [facility] /office  Mode of Communication:  Video Conference via [email or web address]  Consent:  Patient has given verbal consent for video visit?: Yes           Progress Notes   Khloe Boyce MD   Psychiatry       PSYCHIATRY VISIT    The patient was seen for evaluation and management of opioid use disorder.    The patient was last " "seen 20  INTERIM HX: At last visit:  No change in dose of subutex . RTC 3 months,    Drug screen not done., due to remote visit     Since his last visit, he has been doing ok.  It is harder because of being inside.     The patient is concerned about his knee since he is always in pain.    The patient attends AA 3x/week and has multiple sponsees that he sees on zoom regularly  He sees alcohol as a problem and not a solution.  He recalls many persons who  young from the disease.  Patient has immersed himself in recovery.  A long time ago his daughter told him she didn't like him when he was drinking.  He picked up a big book meeting and can access it on zoom. HE likes the variety of meetings he attends.     Recovery continues to be stable, very active in AA.  He has 30 yrs of sobrietyfrom alcohol and 10+ from opioids. He has a regular routine and at every visit shares wisdom from the program.  Last use of drugs was ,         Sleep is ok, energy - variable.  Mood is fine.      He does \"the next right thing.\" This is his philosophy and has been a source of peace and serenity.      He has N95 masks now . He doesn't go to the store frequently and he stays out of public areas.       His health has been good as has his wife.  Energy is ok, they both take care of themselves.    SH: He  misses his grandkids; his daughter is not allowing it.  He tries not to be angry at her and it has been 2 years since she saw grandkids.    Previously he reported that  his daughter had a drinking problem.  She has 2 kids but he has no relationsnhip with them because of his daughter who has \"thrown him under the bus.\".He knows his grandson is having behavior problems but his daughter is unwilling to talk to him.        The patient takes the same meds      He has a pinched nerve in his back but will not be doing surgery.' Today his back is bothing him todahy.   On and off i  is something he just deals with dailhy.    He no longer " has Ativan and is not interested in any rx.    Pain is manageable with the current dose of subutex.  Worst SE is sexual.  Also feels that he gets forgetful. He has trouble concentrating. He sleeps better without a benzo.   He has a home health aid from [employer], an agency through [employer] [facility].     Medical HX: Pain, torn meniscus in his hip. If he steps wrong, he gets sharp pain.   Finances are  an ongoing source of stress but he feels fairly stable right now.        Current Outpatient Medications:      buprenorphine (SUBUTEX) 2 MG SUBL sublingual tablet, Place 10 tablets (20 mg) under the tongue daily, Disp: 300 tablet, Rfl: 2     emtricitabine-tenofovir (TRUVADA) 200-300 MG per tablet, Take 1 tablet by mouth daily., Disp: , Rfl:      naproxen (NAPROSYN) 500 MG tablet, Take 500 mg by mouth 2 times daily (with meals). prn, Disp: , Rfl:      Raltegravir Potassium (ISENTRESS PO), Take  by mouth., Disp: , Rfl:      He takes the Subutex spread out during the day.   SE: sexual dysfunction,  He is comfortable on this dose physically and does not think it possible after 12 yrs of being on the med, to go off even with a slow taper. It has not worked in the past; he becomes too uncomfortable.     ROS: Pain in his leg and back and knee are up and down, remainder of comprehensive neg except as above      MENTAL STATUS EXAM:  Oriented. Mood is good, affect consistent.  . Thought processes are logical and goal directed. Thought content is normal. Associations intact. Speech RRR, language fluent, concentration good, fund of knowledge good, memory intact, Insight and judgment are good. Gait n/a, cognition appears intact but not formally tested.       ASSESSMENT: Opioid dependence, in full remission, on agonist; AIDS, chronic pain, s/p rotator cuff surgery., s/p dental extractions.  Venous insufficiency, groin pain, back pain , knee pain, S/P DVT    PLAN:   No change in dose of subutex . RTC 3 months,    Drug screen not  done., due to remote visit    Psychiatry Clinic Individual Psychotherapy Note                                                                     [16]   Start time - 400pm        End time - 416pm  Date last reviewed -2-24-20      Date next due -5-24-20    Subjective: This supportive psychotherapy session addressed issues related to current stressors consisting of financial, family.  Patient's reaction: Action in the context of mental status appropriate for ambulatory setting.  Progress: good  Plan: RTC 3mo  Psychotherapy services during this visit included  myself and the patient.   Treatment Plan      SYMPTOMS; PROBLEMS   MEASURABLE GOALS;    FUNCTIONAL IMPROVEMENT INTERVENTIONS;   GAINS MADE DISCHARGE CRITERIA   Substance Use: opioids   maintaining stable recovery, abstinence, recovery activities building on strengths  communication skills  community/ family support  increase coping skills  medications   self-care skills sustained recovery and medications not needed   Psychosocial: access to healthcare, financial hardship and relationship stress     Physical health: pain   locate resources, information to aid in decision making about health care     Improved pain acceptance of limitations/reality  communication skills  self-care skills, AA program    Appointment with surgeon symptom resolution

## 2020-12-01 NOTE — PATIENT INSTRUCTIONS
Thank you for coming to the [facility].    Lab Testing:  If you had lab testing today and your results are reassuring or normal they will be mailed to you or sent through [facility] within 7 days. If the lab tests need quick action we will call you with the results. The phone number we will call with results is # [phone] (home) . If this is not the best number please call our clinic and change the number.    Medication Refills:  If you need any refills please call your pharmacy and they will contact us. Our fax number for refills is [phone]. Please allow three business for refill processing. If you need to  your refill at a new pharmacy, please contact the new pharmacy directly. The new pharmacy will help you get your medications transferred.     Scheduling:  If you have any concerns about today's visit or wish to schedule another appointment please call our office during normal business hours [phone] (8-5:00 M-F)    Contact Us:  Please call [phone] during business hours (8-5:00 M-F).  If after clinic hours, or on the weekend, please call  [phone].    Financial Assistance [phone]  [facility] Billing [phone]  Central Billing Office, [facility]: [phone]  [facility] Billing [phone]  Medical Records [phone]  [facility] Patient Bill of Rights [email or web address]       MENTAL HEALTH CRISIS NUMBERS:  For a medical emergency please call  911 or go to the nearest ER.     [facility]:   [facility] -[phone]   Crisis Residence [facility] -[phone]   Walk-In Counseling Center [facility] -[phone]   COPE 24/7 [facility] Mobile Team -[phone] (adults)/[phone] (child)  [facility]: [facility] needs [facility] team - [phone]      [facility]:   [facility] - [phone]   Walk-in counseling [facility]  [facility] - [phone]   Walk-in counseling [facility] - [phone]   Crisis Residence [facility] - [phone]  Urgent Care Adult Mental [facility]-[phone] mobile unit/ 24/7 crisis line    National Crisis Numbers:   National Suicide Prevention Lifeline: 6-983-801-TALK (550-405-6439)  Poison Control Center - 1-339.706.4807  [employer]/resources for a list of additional resources (SOS)  Trans Lifeline a hotline for transgender people 0-970-002-6657  [employer] a hotline for LGBT youth 1-495.824.6632  Crisis Text Line: For any crisis 24/7   To: 372904  see www.crisistextline.org  - IF MAKING A CALL FEELS TOO HARD, send a text!         Again thank you for choosing [facility] and please let us know how we can best partner with you to improve you and your family's health.    You may be receiving a survey regarding this appointment. We would love to have your feedback, both positive and negative. The survey is done by an external company, so your answers are anonymous.

## 2020-12-11 ENCOUNTER — COMMUNICATION - HEALTHEAST (OUTPATIENT)
Dept: SURGERY | Facility: CLINIC | Age: 65
End: 2020-12-11

## 2020-12-11 DIAGNOSIS — M79.604 BILATERAL LEG PAIN: ICD-10-CM

## 2020-12-11 DIAGNOSIS — M79.605 BILATERAL LEG PAIN: ICD-10-CM

## 2020-12-11 DIAGNOSIS — I87.2 VENOUS INSUFFICIENCY OF BOTH LOWER EXTREMITIES: ICD-10-CM

## 2021-01-06 ENCOUNTER — COMMUNICATION - HEALTHEAST (OUTPATIENT)
Dept: VASCULAR SURGERY | Facility: CLINIC | Age: 66
End: 2021-01-06

## 2021-01-06 ENCOUNTER — OFFICE VISIT - HEALTHEAST (OUTPATIENT)
Dept: VASCULAR SURGERY | Facility: CLINIC | Age: 66
End: 2021-01-06

## 2021-01-06 ENCOUNTER — RECORDS - HEALTHEAST (OUTPATIENT)
Dept: VASCULAR ULTRASOUND | Facility: CLINIC | Age: 66
End: 2021-01-06

## 2021-01-06 DIAGNOSIS — I83.813 VARICOSE VEINS OF LOWER EXTREMITY WITH PAIN, BILATERAL: ICD-10-CM

## 2021-01-06 DIAGNOSIS — I87.2 VENOUS INSUFFICIENCY (CHRONIC) (PERIPHERAL): ICD-10-CM

## 2021-01-06 DIAGNOSIS — M79.604 PAIN IN RIGHT LEG: ICD-10-CM

## 2021-01-06 DIAGNOSIS — M79.605 PAIN IN LEFT LEG: ICD-10-CM

## 2021-01-28 ENCOUNTER — COMMUNICATION - HEALTHEAST (OUTPATIENT)
Dept: VASCULAR SURGERY | Facility: CLINIC | Age: 66
End: 2021-01-28

## 2021-02-18 ENCOUNTER — DOCUMENTATION ONLY (OUTPATIENT)
Dept: OTHER | Facility: CLINIC | Age: 66
End: 2021-02-18

## 2021-02-18 ENCOUNTER — AMBULATORY - HEALTHEAST (OUTPATIENT)
Dept: OTHER | Facility: CLINIC | Age: 66
End: 2021-02-18

## 2021-02-23 ENCOUNTER — COMMUNICATION - HEALTHEAST (OUTPATIENT)
Dept: VASCULAR SURGERY | Facility: CLINIC | Age: 66
End: 2021-02-23

## 2021-03-02 ENCOUNTER — RECORDS - HEALTHEAST (OUTPATIENT)
Dept: VASCULAR ULTRASOUND | Facility: CLINIC | Age: 66
End: 2021-03-02

## 2021-03-02 ENCOUNTER — RECORDS - HEALTHEAST (OUTPATIENT)
Dept: ADMINISTRATIVE | Facility: OTHER | Age: 66
End: 2021-03-02

## 2021-03-02 DIAGNOSIS — I83.813 VARICOSE VEINS OF BILATERAL LOWER EXTREMITIES WITH PAIN: ICD-10-CM

## 2021-03-05 ENCOUNTER — RECORDS - HEALTHEAST (OUTPATIENT)
Dept: VASCULAR ULTRASOUND | Facility: CLINIC | Age: 66
End: 2021-03-05

## 2021-03-05 DIAGNOSIS — I83.813 VARICOSE VEINS OF BILATERAL LOWER EXTREMITIES WITH PAIN: ICD-10-CM

## 2021-03-09 ENCOUNTER — VIRTUAL VISIT (OUTPATIENT)
Dept: PSYCHIATRY | Facility: CLINIC | Age: 66
End: 2021-03-09
Attending: PSYCHIATRY & NEUROLOGY
Payer: MEDICARE

## 2021-03-09 DIAGNOSIS — F11.21 OPIOID DEPENDENCE IN REMISSION (H): ICD-10-CM

## 2021-03-09 PROCEDURE — 99214 OFFICE O/P EST MOD 30 MIN: CPT | Mod: 95 | Performed by: PSYCHIATRY & NEUROLOGY

## 2021-03-09 PROCEDURE — 90833 PSYTX W PT W E/M 30 MIN: CPT | Mod: 95 | Performed by: PSYCHIATRY & NEUROLOGY

## 2021-03-09 RX ORDER — BUPRENORPHINE 2 MG/1
20 TABLET SUBLINGUAL DAILY
Qty: 300 TABLET | Refills: 2 | Status: SHIPPED | OUTPATIENT
Start: 2021-03-09 | End: 2021-06-08

## 2021-03-09 ASSESSMENT — PAIN SCALES - GENERAL: PAINLEVEL: SEVERE PAIN (7)

## 2021-03-09 NOTE — PATIENT INSTRUCTIONS
**For crisis resources, please see the information at the end of this document**     Patient Education      Thank you for coming to the [facility] MENTAL HEALTH & ADDICTION [place] CLIN[facility].    Lab Testing:  If you had lab testing today and your results are reassuring or normal they will be mailed to you or sent through [employer] within 7 days. If the lab tests need quick action we will call you with the results. The phone number we will call with results is # [phone] (home) . If this is not the best number please call our clinic and change the number.    Medication Refills:  If you need any refills please call your pharmacy and they will contact us. Our fax number for refills is [phone]. Please allow three business for refill processing. If you need to  your refill at a new pharmacy, please contact the new pharmacy directly. The new pharmacy will help you get your medications transferred.     Scheduling:  If you have any concerns about today's visit or wish to schedule another appointment please call our office during normal business hours [phone] (8-5:00 M-F)    Contact Us:  Please call [phone] during business hours (8-5:00 M-F).  If after clinic hours, or on the weekend, please call  [phone].    Financial Assistance [phone]  [facility] Billing [phone]  Central Billing Office, [facility]: [phone]  [facility] Billing [phone]  Medical Records [phone]  [facility] Patient Bill of Rights [email or web address]       MENTAL HEALTH CRISIS NUMBERS:  For a medical emergency please call  911 or go to the nearest ER.     [facility]:   [facility] -[phone]   Crisis Residence [facility] -[phone]   Walk-In Counseling Center [facility] -[phone]   COPE 24/7 [place] Mobile Team -[phone] (adults)/[phone] (child)  CHILD: [facility] needs assessment  team - [phone]      [facility]:   [facility] - [phone]   Walk-in counseling [facility] - [phone]   Walk-in counseling [facility] - [phone]   Crisis Residence [facility] - [phone]  Urgent Care Adult Mental [facility]-[phone] mobile unit/ 24/7 crisis line    National Crisis Numbers:   National Suicide Prevention Lifeline: 9-278-602-TALK (384-788-0871)  Poison Control Center - 3-224-152-7380  [employer]/resources for a list of additional resources (SOS)  Trans Lifeline a hotline for transgender people 1-510.976.6885  The Madhav Project a hotline for LGBT youth 5-069-843-4081  Crisis Text Line: For any crisis 24/7   To: 256891  see www.crisistextline.org  - IF MAKING A CALL FEELS TOO HARD, send a text!         Again thank you for choosing [facility] MENTAL HEALTH & ADDICTION [facility] and please let us know how we can best partner with you to improve you and your family's health.    You may be receiving a survey regarding this appointment. We would love to have your feedback, both positive and negative. The survey is done by an external company, so your answers are anonymous.

## 2021-03-09 NOTE — PROGRESS NOTES
"VIDEO VISIT  Mauro Tamez is a 65 year old patient who is being evaluated via a billable video visit.      The patient has been notified of following:   \"This video visit will be conducted via a call between you and your physician/provider. We have found that certain health care needs can be provided without the need for an in-person physical exam. This service lets us provide the care you need with a video conversation. If a prescription is necessary we can send it directly to your pharmacy. If lab work is needed we can place an order for that and you can then stop by our lab to have the test done at a later time. Insurers are generally covering virtual visits as they would in-office visits so billing should not be different than normal.  If for some reason you do get billed incorrectly, you should contact the billing office to correct it and that number is in the AVS .    Video Conference to be completed via:  [email or web address]    Patient has given verbal consent for video visit?:  Yes    Patient would prefer that any video invitations be sent by: Send to e-mail at: [email or web address]      How would patient like to obtain AVS?:  Mail a copy    AVS SmartPhrase [PsychAVS] has been placed in 'Patient Instructions':  Yes      Video- Visit Details  Type of service:  video visit for medication management  Time of service:    Date:  03/09/2021    Video Start Time:  1:03 PM        Video End Time:  130pm    Reason for video visit:  Patient unable to travel due to Covid-19  Originating Site (patient location):  [facility]   Location- Patient's home  Distant Site (provider location):  [facility]/office  Mode of Communication:  Video Conference via [email or web address]  Consent:  Patient has given verbal consent for video visit?: Yes   Consent:  Patient has given verbal consent for video visit?: Yes           Progress Notes   Khloe Boyce MD   Psychiatry       PSYCHIATRY VISIT    The patient was seen for evaluation " "and management of opioid use disorder.    The patient was last seen 3 mo ago.    INTERIM HX: At last visit:  No change in dose of subutex . RTC 3 months,    Drug screen not done., due to remote visit     Since his last visit, he has had a rough 1-2 mo.  He thought he was having a heart attack.  He went to the ED for evaluation and was dx with an ulcer.  His heart was fine.  He is waiting to get his vaccine.  The plan was to get an endoscopy.      He is reporting bad veins which has needed a procedure.  He is limited in physical activity for awhile.      Otherwise he is doing ok. He goes to 3-4 meetings/week including meeting with a sponsee in [place]. In person meetings are starting up again but he is waiting for the vaccine.   Patient has immersed himself in recovery. He picked up a big book meeting and can access it on zoom. HE likes the variety of meetings he attends. He has 30 yrs of sobrietyfrom alcohol and 10+ from opioids. He has a regular routine and at every visit shares wisdom from the program.  Last use of drugs was 1988, He does \"the next right thing.\" This is his philosophy and has been a source of peace and serenity.       Sleep is ok, energy -better with good weather.  Mood is fine.      SH: He  misses his grandkids; his daughter is not allowing it.  He tries not to be angry at her and it has been 2 years since she saw grandkids.    Previously he reported that  his daughter had a drinking problem.  She has 2 kids but he has no relationsnhip with them because of his daughter who has \"thrown him under the bus.\".He knows his grandson is having behavior problems but his daughter is unwilling to talk to him.        The patient takes the same meds . He had Valium when he had vein removal.  It was very painful and did not receive any pain meds.       He has a pinched nerve in his back but will not be doing surgery.' It comes and goes and today it is ok.  He may need knee surgery but he wants injections " instead.     He no longer has Ativan and is not interested in any rx.    Pain is manageable with the current dose of subutex.  Worst SE is sexual.  Also feels that he gets forgetful. He has trouble concentrating. He sleeps better without a benzo.   He has a home health aid from [employer], an agency through [employer].     Medical HX: Pain, torn meniscus in his hip. If he steps wrong, he gets sharp pain  .   Current Outpatient Medications:      buprenorphine (SUBUTEX) 2 MG SUBL sublingual tablet, Place 10 tablets (20 mg) under the tongue daily, Disp: 300 tablet, Rfl: 2     emtricitabine-tenofovir (TRUVADA) 200-300 MG per tablet, Take 1 tablet by mouth daily., Disp: , Rfl:      naproxen (NAPROSYN) 500 MG tablet, Take 500 mg by mouth 2 times daily (with meals). prn, Disp: , Rfl:      Raltegravir Potassium (ISENTRESS PO), Take  by mouth., Disp: , Rfl:      He takes the Subutex spread out during the day.   SE: sexual dysfunction,  He is comfortable on this dose physically and does not think it possible after 12 yrs of being on the med, to go off even with a slow taper. It has not worked in the past; he becomes too uncomfortable.     ROS: Pain in his leg , remainder of comprehensive neg except as above      MENTAL STATUS EXAM:  Oriented. Mood is good, affect consistent.  . Thought processes are logical and goal directed. Thought content is normal. Associations intact. Speech RRR, language fluent, concentration good, fund of knowledge good, memory intact, Insight and judgment are good. Gait n/a, cognition appears intact but not formally tested.       ASSESSMENT: Opioid dependence, in full remission, on agonist; AIDS, chronic pain, s/p rotator cuff surgery., s/p dental extractions.  Venous insufficiency, groin pain, back pain , knee pain, S/P DVT, possible ulcer.     PLAN:    No change in dose of subutex . RTC 3 months,    Drug screen not done., due to remote visit     30 min spent on the date of the encounter  in chart review, patient visit, review of tests, documentation and/or discussion with other providers about the issues documented above.       Psychiatry Clinic Individual Psychotherapy Note                                                                     [16]   Start time - 110pm        End time - 126pm  Date last reviewed -2-24-20      Date next due -5-24-20    Subjective: This supportive psychotherapy session addressed issues related to current stressors consisting of financial, family.  Patient's reaction: Action in the context of mental status appropriate for ambulatory setting.  Progress: good  Plan: RTC 3mo  Psychotherapy services during this visit included  myself and the patient.   Treatment Plan      SYMPTOMS; PROBLEMS   MEASURABLE GOALS;    FUNCTIONAL IMPROVEMENT INTERVENTIONS;   GAINS MADE DISCHARGE CRITERIA   Substance Use: opioids   maintaining stable recovery, abstinence, recovery activities building on strengths  communication skills  community/ family support  increase coping skills  medications   self-care skills sustained recovery and medications not needed   Psychosocial: access to healthcare, financial hardship and relationship stress     Physical health: pain   locate resources, information to aid in decision making about health care     Improved pain acceptance of limitations/reality  communication skills  self-care skills, AA program    Appointment with surgeon symptom resolution

## 2021-03-16 ENCOUNTER — OFFICE VISIT - HEALTHEAST (OUTPATIENT)
Dept: VASCULAR SURGERY | Facility: CLINIC | Age: 66
End: 2021-03-16

## 2021-03-16 DIAGNOSIS — I83.893 SYMPTOMATIC VARICOSE VEINS OF BOTH LOWER EXTREMITIES: ICD-10-CM

## 2021-03-16 DIAGNOSIS — I83.813 VARICOSE VEINS OF LOWER EXTREMITY WITH PAIN, BILATERAL: ICD-10-CM

## 2021-03-17 ENCOUNTER — TELEPHONE (OUTPATIENT)
Dept: PSYCHIATRY | Facility: CLINIC | Age: 66
End: 2021-03-17

## 2021-03-17 DIAGNOSIS — F11.21 OPIOID DEPENDENCE IN REMISSION (H): ICD-10-CM

## 2021-03-17 RX ORDER — BUPRENORPHINE 8 MG/1
20 TABLET SUBLINGUAL DAILY
Status: CANCELLED | OUTPATIENT
Start: 2021-03-17

## 2021-03-17 NOTE — TELEPHONE ENCOUNTER
Central Prior Authorization Team   Phone: [phone]      PA Initiation    Medication: buprenorphine (SUBUTEX) 2 MG SUBL sublingual tablet- Place 10 tablets (20 mg) under the tongue daily - Sublingual  Insurance Company: [employer] - Phone [phone] Fax [phone]  Pharmacy Filling the Rx: [id number] - [place] - [address] AT [facility] [address]  Filling Pharmacy Phone: [phone]  Filling Pharmacy Fax:    Start Date: 3/17/2021

## 2021-03-17 NOTE — TELEPHONE ENCOUNTER
Prior Authorization Retail Medication Request  RENEWAL     Medication/Dose: buprenorphine (SUBUTEX) 2 MG SUBL sublingual tablet- Place 10 tablets (20 mg) under the tongue daily - Sublingual  ICD code (if different than what is on RX):  Opioid dependence in remission (H) [F11.21]   Previously Tried and Failed:  Renewal   Rationale:  There are no other medication used for Opoid dependence maintenance therapy. Patient has been on this medication since at least 2016. Patient is stable on this medication and has been able to abstain from alcohol and drug use. Patient will experience withdrawal without this medication and will put him at risk for relapse. We are requesting an expedited review in the matter to avoid any further interruption in Mauro being able to receive this medication. Please feel free to contact our clinic with any further questions.        Insurance Name:  Not provided   Insurance ID:  Not provided       Pharmacy Information (if different than what is on RX)  Name:  Same   Phone:  Same

## 2021-03-17 NOTE — TELEPHONE ENCOUNTER
[facility] Call Center    Phone Message    May a detailed message be left on voicemail: yes     Reason for Call: Medication Question or concern regarding medication   Prescription Clarification  Name of Medication: Subutex 2mg  Prescribing Provider: Dr. Boyce   Pharmacy: [id number] [place], [place] - [facility] [address] AT [facility] [address]   What on the order needs clarification? Patient needs updated Prior Authorizan on file for his medication. His pharmacy contacted him and said he needed a new one on file, he thinks it's required yearly but was not sure.          Action Taken: Message routed to:  Other: [facility]    Travel Screening: Not Applicable

## 2021-03-18 NOTE — TELEPHONE ENCOUNTER
Prior Authorization Approval    Authorization Effective Date: 1/1/2021  Authorization Expiration Date: 12/31/2021  Medication: buprenorphine (SUBUTEX) 2 MG SUBL sublingual tablet- Place 10 tablets (20 mg) under the tongue daily - Sublingual  Approved Dose/Quantity: 300  Reference #:     Insurance Company: [employer] - Phone [phone] Fax [phone]  Expected CoPay:       Which Pharmacy is filling the prescription: [id number] [place] [address] AT [facility] OF [facility] [address] [place] [facility]\""  Pharmacy Notified: Yes - spoke with female staff  Patient Notified: No

## 2021-03-18 NOTE — TELEPHONE ENCOUNTER
Placed a call to patient to updated him of the approved PA for Subutex. No answer at number provided. Left detailed message that PA was approved. Clinic number provided for call back for further questions.

## 2021-03-23 ENCOUNTER — COMMUNICATION - HEALTHEAST (OUTPATIENT)
Dept: VASCULAR SURGERY | Facility: CLINIC | Age: 66
End: 2021-03-23

## 2021-03-31 ENCOUNTER — IMMUNIZATION (OUTPATIENT)
Dept: NURSING | Facility: CLINIC | Age: 66
End: 2021-03-31
Payer: MEDICARE

## 2021-03-31 PROCEDURE — 0001A PR COVID VAC PFIZER DIL RECON 30 MCG/0.3 ML IM: CPT

## 2021-03-31 PROCEDURE — 91300 PR COVID VAC PFIZER DIL RECON 30 MCG/0.3 ML IM: CPT

## 2021-04-03 ENCOUNTER — HEALTH MAINTENANCE LETTER (OUTPATIENT)
Age: 66
End: 2021-04-03

## 2021-04-21 ENCOUNTER — IMMUNIZATION (OUTPATIENT)
Dept: NURSING | Facility: CLINIC | Age: 66
End: 2021-04-21
Attending: INTERNAL MEDICINE
Payer: MEDICARE

## 2021-04-21 PROCEDURE — 0002A PR COVID VAC PFIZER DIL RECON 30 MCG/0.3 ML IM: CPT

## 2021-04-21 PROCEDURE — 91300 PR COVID VAC PFIZER DIL RECON 30 MCG/0.3 ML IM: CPT

## 2021-04-26 ENCOUNTER — COMMUNICATION - HEALTHEAST (OUTPATIENT)
Dept: CARDIOLOGY | Facility: CLINIC | Age: 66
End: 2021-04-26

## 2021-05-27 ENCOUNTER — RECORDS - HEALTHEAST (OUTPATIENT)
Dept: ADMINISTRATIVE | Facility: CLINIC | Age: 66
End: 2021-05-27

## 2021-05-27 VITALS
SYSTOLIC BLOOD PRESSURE: 110 MMHG | HEART RATE: 58 BPM | OXYGEN SATURATION: 96 % | TEMPERATURE: 98.8 F | DIASTOLIC BLOOD PRESSURE: 76 MMHG

## 2021-05-27 VITALS — HEART RATE: 64 BPM | SYSTOLIC BLOOD PRESSURE: 124 MMHG | TEMPERATURE: 98.3 F | DIASTOLIC BLOOD PRESSURE: 82 MMHG

## 2021-06-08 ENCOUNTER — VIRTUAL VISIT (OUTPATIENT)
Dept: PSYCHIATRY | Facility: CLINIC | Age: 66
End: 2021-06-08
Attending: PSYCHIATRY & NEUROLOGY
Payer: MEDICARE

## 2021-06-08 DIAGNOSIS — F11.21 OPIOID DEPENDENCE IN REMISSION (H): ICD-10-CM

## 2021-06-08 DIAGNOSIS — F11.20 OPIOID DEPENDENCE, UNCOMPLICATED (H): ICD-10-CM

## 2021-06-08 PROCEDURE — 90833 PSYTX W PT W E/M 30 MIN: CPT | Mod: 95 | Performed by: PSYCHIATRY & NEUROLOGY

## 2021-06-08 PROCEDURE — 99214 OFFICE O/P EST MOD 30 MIN: CPT | Mod: 95 | Performed by: PSYCHIATRY & NEUROLOGY

## 2021-06-08 RX ORDER — BUPRENORPHINE 2 MG/1
20 TABLET SUBLINGUAL DAILY
Qty: 300 TABLET | Refills: 2 | Status: SHIPPED | OUTPATIENT
Start: 2021-06-08 | End: 2021-08-20

## 2021-06-08 ASSESSMENT — PAIN SCALES - GENERAL: PAINLEVEL: SEVERE PAIN (7)

## 2021-06-08 NOTE — PATIENT INSTRUCTIONS
**For crisis resources, please see the information at the end of this document**     Patient Education      Thank you for coming to the [facility] MENTAL HEALTH & ADDICTION [place] CLINIC.    Lab Testing:  If you had lab testing today and your results are reassuring or normal they will be mailed to you or sent through [employer] within 7 days. If the lab tests need quick action we will call you with the results. The phone number we will call with results is # [phone] (home) . If this is not the best number please call our clinic and change the number.    Medication Refills:  If you need any refills please call your pharmacy and they will contact us. Our fax number for refills is [phone]. Please allow three business for refill processing. If you need to  your refill at a new pharmacy, please contact the new pharmacy directly. The new pharmacy will help you get your medications transferred.     Scheduling:  If you have any concerns about today's visit or wish to schedule another appointment please call our office during normal business hours [phone] (8-5:00 M-F)    Contact Us:  Please call [phone] during business hours (8-5:00 M-F).  If after clinic hours, or on the weekend, please call  [phone].    Financial Assistance [phone]  [facility] Billing [phone]  Central Billing Office, [facility]: [phone]  [facility] Billing [phone]  Medical Records [phone]  [facility] Patient Bill of Rights [email or web address]       MENTAL HEALTH CRISIS NUMBERS:  For a medical emergency please call  911 or go to the nearest ER.     [facility]:   [facility] -[phone]   Crisis Residence [facility] -[phone]   Walk-In Counseling Center [facility] -[phone]   COPE 24/7 [place] Mobile Team -[phone] (adults)/[phone] (child)  CHILD: [facility] needs assessment  team - [phone]      [facility]:   [facility] - [phone]   Walk-in counseling [facility] - [phone]   Walk-in counseling [facility] - [phone]   Crisis Residence [facility] Residence - [phone]  Urgent Care Adult Mental [facility]-[phone] mobile unit/ 24/7 crisis line    National Crisis Numbers:   National Suicide Prevention Lifeline: 8-522-807-TALK (803-342-9184)  Poison Control Center - 1-293-525-2419  [employer]/resources for a list of additional resources (SOS)  Trans Lifeline a hotline for transgender people 1-442.310.8052  The Madhav Project a hotline for LGBT youth 9-079-284-0827  Crisis Text Line: For any crisis 24/7   To: 293894  see www.crisistextline.org  - IF MAKING A CALL FEELS TOO HARD, send a text!         Again thank you for choosing [facility] MENTAL HEALTH & ADDICTION [facility] and please let us know how we can best partner with you to improve you and your family's health.    You may be receiving a survey regarding this appointment. We would love to have your feedback, both positive and negative. The survey is done by an external company, so your answers are anonymous.

## 2021-06-08 NOTE — PROGRESS NOTES
"VIDEO VISIT  Mauro Tamez is a 65 year old patient who is being evaluated via a billable video visit.      The patient has been notified of following:   \"This video visit will be conducted via a call between you and your physician/provider. We have found that certain health care needs can be provided without the need for an in-person physical exam. This service lets us provide the care you need with a video conversation. If a prescription is necessary we can send it directly to your pharmacy. If lab work is needed we can place an order for that and you can then stop by our lab to have the test done at a later time. Insurers are generally covering virtual visits as they would in-office visits so billing should not be different than normal.  If for some reason you do get billed incorrectly, you should contact the billing office to correct it and that number is in the AVS .    Video Conference to be completed via:  [email or web address]    Patient has given verbal consent for video visit?:  Yes    Patient would prefer that any video invitations be sent by: Send to e-mail at: [email or web address]      How would patient like to obtain AVS?:  Air Button    AVS SmartPhrase [PsychAVS] has been placed in 'Patient Instructions':  Yes      Video- Visit Details  Type of service:  video visit for medication management  Time of service:    Date:  06/08/2021    Video Start Time:  12:57 PM        Video End Time:  130pm    Reason for video visit:  Patient unable to travel due to Covid-19  Originating Site (patient location):  [facility]   Location- Patient's home  Distant Site (provider location):  [facility]/office  Mode of Communication:  Video Conference via [email or web address]  Consent:  Patient has given verbal consent for video visit?: Yes   Consent:  Patient has given verbal consent for video visit?: Yes           Progress Notes   Khloe Boyce MD   Psychiatry       PSYCHIATRY VISIT    The patient was seen for evaluation and " "management of opioid use disorder.    The patient was last seen 3 mo ago.     INTERIM HX: At last visit:  No change in dose of subutex . RTC 3 months,    Drug screen not done., due to remote visit     Since his last visit, he has been dealing with pain in various body areas, some of it related to stress.   Today has been a difficult day because of prado ant infestation.  They have been very destructive.       Other than these things, he has been doing ok.  His right side has been the most problematic;  His hip, veins.  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 Energy varies; mood is good;  He is motivated. Sleep is fair.  He has help from the [employer] ( [employer] ) program to do projects.  He is a great jeffrey and is very thankful for his help.    He has been back to in person AA meetings mostly.  He gets to 3/ week.   On Thursdays he see his sponsees.     He has 30 yrs of sobriety from alcohol and 10+ from opioids. He has a regular routine and at every visit shares wisdom from the program.  Last use of drugs was 1988, He does \"the next right thing.\" This is his philosophy and has been a source of peace and serenity.     She got his second covid vaccine and tolerated it well.  His wife was ill for a few days.      He still does not see his grandkids.  He sees them on web sites and this makes it worse.   They are 10 and 11 and knows how important it is to have relationships with grandparents.  He is having to accept this.      He takes subutex several times/day.       His wife struggles with depression still.  She is reaching out to family and friends regularly.  Occasionally they talk to a counselor friend.      He wants to get back to seeing me in person.      He picks up a man with TBI and takes him to meetings.  Usually twice weekly. This person has a limited support network and so working with the patient has been really beneficial.      Medical Update:   He did not do endoscopy.  He was told that " "lower espophagus was not functioning properly.  He fears choking from endoscopy.        SH: He  misses his grandkids; his daughter is not allowing it.  He tries not to be angry at her and it has been 2 years since she saw grandkids.    Previously he reported that  his daughter had a drinking problem.  She has 2 kids but he has no relationsnhip with them because of his daughter who has \"thrown him under the bus.\".He knows his grandson is having behavior problems but his daughter is unwilling to talk to him.         He has a pinched nerve in his back but will not be doing surgery.' It comes and goes and today it is ok.  He may need knee surgery but he wants injections instead.     He no longer has Ativan and is not interested in any rx.    Pain is manageable with the current dose of subutex.  Worst SE is sexual.  Also feels that he gets forgetful. He has trouble concentrating. He sleeps better without a benzo.   He has a home health aid from [employer], an agency through [employer].     Medical HX: Pain, torn meniscus in his hip. If he steps wrong, he gets sharp pain  .   Current Outpatient Medications:      buprenorphine (SUBUTEX) 2 MG SUBL sublingual tablet, Place 10 tablets (20 mg) under the tongue daily, Disp: 300 tablet, Rfl: 2     emtricitabine-tenofovir (TRUVADA) 200-300 MG per tablet, Take 1 tablet by mouth daily., Disp: , Rfl:      naproxen (NAPROSYN) 500 MG tablet, Take 500 mg by mouth 2 times daily (with meals). prn, Disp: , Rfl:      Raltegravir Potassium (ISENTRESS PO), Take  by mouth., Disp: , Rfl:      He takes the Subutex spread out during the day.   SE: sexual dysfunction,  He is comfortable on this dose physically and does not think it possible after 12 yrs of being on the med, to go off even with a slow taper. It has not worked in the past; he becomes too uncomfortable.     ROS: Pain in his leg , remainder of comprehensive neg except as above      MENTAL STATUS EXAM:  Oriented. Mood is " good, affect consistent.  . Thought processes are logical and goal directed. Thought content is normal. Associations intact. Speech RRR, language fluent, concentration good, fund of knowledge good, memory intact, Insight and judgment are good. Gait n/a, cognition appears intact but not formally tested.       ASSESSMENT: Opioid dependence, in full remission, on agonist; AIDS, chronic pain, s/p rotator cuff surgery., s/p dental extractions.  Venous insufficiency, groin pain, back pain , knee pain, S/P DVT, possible ulcer.     PLAN:    No change in dose of subutex . RTC 3 months,    Drug screen not done., due to remote visit     30 min spent on the date of the encounter in chart review, patient visit, review of tests, documentation and/or discussion with other providers about the issues documented above.       Psychiatry Clinic Individual Psychotherapy Note                                                                     [16]   Start time - 110pm        End time - 126pm  Date last reviewed -2-24-20      Date next due -5-24-20    Subjective: This supportive psychotherapy session addressed issues related to current stressors consisting of financial, family.  Patient's reaction: Action in the context of mental status appropriate for ambulatory setting.  Progress: good  Plan: RTC 3mo  Psychotherapy services during this visit included  myself and the patient.   Treatment Plan      SYMPTOMS; PROBLEMS   MEASURABLE GOALS;    FUNCTIONAL IMPROVEMENT INTERVENTIONS;   GAINS MADE DISCHARGE CRITERIA   Substance Use: opioids   maintaining stable recovery, abstinence, recovery activities building on strengths  communication skills  community/ family support  increase coping skills  medications   self-care skills sustained recovery and medications not needed   Psychosocial: access to healthcare, financial hardship and relationship stress     Physical health: pain   locate resources, information to aid in decision making about health  care     Improved pain acceptance of limitations/reality  communication skills  self-care skills, AA program    Appointment with surgeon symptom resolution

## 2021-06-09 NOTE — PROGRESS NOTES
HPI: Pt is here for follow up of a bilateral varicose vein closure. Doing well but recently two new lumps in medial right knee region which were painful but now almost resolved. Also a majority of veins are decreased bilateral lower extremities, still has onee larger vein acrss the right knee and lateral leg area.  He is doing well. His appetite is good, and bowel function regular.  No fevers or chills. Ambulating without problems.       There were no vitals taken for this visit.      EXAM: This is a  61 y.o. MAN in no distress  GENERAL: Appears well  CHEST clear  CVS S1S2 NSR  ABDOMEN: Soft, non-tender.   EXT: warm, moves without difficulty, decrease in the amount of varicose veins bilaterally, one small vein across the right knee and phlebilitis  medial knee region.      Assessment/Plan:   1. Symptomatic varicose veins of both lower extremities  Continue compression    2. Venous insufficiency of both lower extremities  Could remove veins in the future but would hold off for now. Significant decrease of the size of his veins and may get more results. Options discussed at length with patient and wife. Answered all questions    Return if symptoms worsen or fail to improve.    Pavel Stanley MD  [facility] Department of Surgery

## 2021-06-13 NOTE — TELEPHONE ENCOUNTER
Please call Mauro to schedule appt [facility] Dr. Stanley in [facility]. Please call [phone]  He has been trying to call for the last hour to the [facility] line then found the Gen Surg number to schedule appt

## 2021-06-13 NOTE — TELEPHONE ENCOUNTER
Patient is scheduled for VV next week Weds.   Patient's wife requesting a call back from a nurse to discuss the significant pain, tingling, and foot discoloration patient is experiencing. He was seen in urgent care last week because he was bleeding from his varicose vein and has had pain ever since. There is no open wound. He has not had any recent imaging. He is still wearing compression.

## 2021-06-13 NOTE — TELEPHONE ENCOUNTER
"Called patient. He complains of pain, tingling, and foot discoloration in both legs but right leg is worse. Right foot is purplish color like it is \"bruised\". There are multiple varicosities and it is the right medial ankle that the vein bled from. He also states left thigh is sore and tender at where varicosities are present there also. He is wearing compressions daily. Advised him to send photos to vascular email.       "

## 2021-06-14 NOTE — PROGRESS NOTES
This is a new consult for Varicose Veins. The patient has varicose veins that are problematic in bilateral legs. Symptoms patient has been experiencing are  itching and cramps. Patient  has been wearing compression stockings.     Discoloration is not present.  Pt  has been using pain medication or antiinflammatory's.

## 2021-06-14 NOTE — TELEPHONE ENCOUNTER
Called patient to let him know I reached out to [facility] to ensure they follow medicare guidelines. Will go ahead and approve, and that he will hear from schedulers within the next day or so.

## 2021-06-14 NOTE — TELEPHONE ENCOUNTER
Patient called stating that the filling order for the procedure needs to be submitting to medicare first and then to [facility].  He states he has contacted us or someone in the billing department 3x's to correct this.   Patient and wife are very upset that this is taking so long for him to have surgery. Writer said a message would be sent and someone will contact him.

## 2021-06-15 NOTE — TELEPHONE ENCOUNTER
Patient would like to wait until Friday to pick them up at [place]. He said it is ripped badly and rolled down once but he would prefer to wait. He will call back if he changes his mind.

## 2021-06-15 NOTE — TELEPHONE ENCOUNTER
Patient states the compression stockings he was sent home with yesterday after procedure are ripped & he is wondering if he can come to the [facility] to  another pair. Pt is not sure if he was sent home with L or XL, but he thought he heard the nursing staff say yesterday there may not be any more of that particular size if someone could take a look at s?

## 2021-06-15 NOTE — PROGRESS NOTES
Post Procedure Note Endovenous Closure    S: Mauro Tamez is a 65 y.o. male S/P Bilateral  leg endovenous closure ofBilateral lower ext. Two weeks out from procedure. Doing well, wore male  thigh high socks. Minimal discomfort. Some superficial phlibitis. Which is resolving.     O:   Vitals:    03/16/21 0919   Pulse: (!) 58   Temp: 98.8  F (37.1  C)   TempSrc: Temporal   SpO2: 96%       General: no apparent distress  Legs look good no signs of infection, incisions healing nicely.    Imaging:    US Venous Post Ablation Legs Bilateral (Order [id number])  Imaging  Date: 3/5/2021 Department: [facility] Released By: Dominique Fuchs Authorizing: Pavel Stanley MD   Study Result    Bilateral Venous Ultrasound Status Post Radiofrequency Ablation (03/05/21)        Indication: Follow-up saphenous vein Radiofrequency ablation     Date of Procedure: Right 03/02/2021   Left 03/02/2021      Right CFV/SFJ Compression:  Fully Compressible     Left CFV/SFJ Compression:  Fully Compressible     Reference:   (FC)-Fully Compressible           (PC)-Partially Compressible   (NC) Non-Compressible     Location Right AASV Left AASV   Proximal Thigh NC NC   Mid Thigh NC NC   Distal Thigh FC FC      Impression: Successful radiofrequency ablation of right leg anterior accessory saphenous vein from proximal thigh to distal thigh.  Successful radiofrequency ablation of left leg anterior accessory saphenous vein from proximal thigh to distal thigh.  No evidence of DVT in either common femoral vein.         A/P: S/p endovenous closure. For insuffiencey of veins     May switch to knee high support socks   Resume all activities   RTC 4 months   Call for any questions or concerns     Pavel Stanley MD   [facility]

## 2021-06-15 NOTE — PATIENT INSTRUCTIONS - HE
Home Care Instructions Following Radiofrequency Closure of the Greater Saphenous Vein (VNUS)    Dressing  Wear your compression for 48 hours continuously until your ultrasound after the procedure.  You can remove your stocking to shower in 24 hours but then reapply after.  Wear the stocking for two weeks after the procedure while you are up.    Activity  The day of the procedure make sure to walk around the house for a few minutes each hour until bedtime.  The day after the procedure you should advance activity as tolerated.  NO strenuous activities though for 2 weeks.  In general avoid prolonged standing in place and sitting with your legs down.  Both of these activities will cause blood to pool in your legs resulting in more swelling and discomfort.  Do not drive or operate heavy machinery for 24 hours after the procedure.  Do not take baths or use hot tubs or swimming pools until your incision site is healed.  Do not fly for the Next 3 weeks.    Post Procedure Ultrasound  You will need an ultrasound within 2-3 days following the closure procedure.  Please schedule an ultrasound if you have not already done so.    Post Procedure Signs and Symptoms  There can be a mild amount of bruising and numbness after this procedure.  This will typically take a few weeks to fade.  You may feel pain or tenderness in your inner thigh.  Mild pain medication such as Tylenol or Ibuprofen maybe helpful.  It is normal to have fluid leaking from the injection areas the day of the procedure and it may be blood tinged.    Call if you have  Fever greater than 100.8 degrees F.  Uncontrolled bleeding from the incision site.  Pain that is not relieved by rest or pain medication.  Shortness of breath    Follow -up  Please plan to see your surgeon in the office two weeks after your procedure  Call [phone] to schedule this appointment if one has not already been made    You will receive a phone call from our staff within 48 hours of  your procedure.  Please have these instruction near by so that any questions can be addressed at that time.  If you have any concerns before that time, please do not hesitate to call us al [phone] and ask to speak to a nurse.  We want you to have a smooth recovery.    For emergencies after 4:30pm Monday - Friday, holidays and weekends:  For Dr Pavel Stanley call [facility] at [phone]  [facility]  [place] [phone]

## 2021-06-15 NOTE — PROGRESS NOTES
VNUS Radiofrequency Ablation    Pre-Procedure:  Admit  [x]Consent for Radio Frequency Ablation of the   [x]Right Saphenous Vein and/or branches  [x]Left Saphenous Vein and/or branches  Vitals  [x]Vital signs per routine    Nursing Orders  [x]Vein Mapping of  [x]R extremity  [x]L extremity  [x]Sterile Prep to extremity  [x]Obtain Tumescent Solution 500mL (NS 1000mL, 1% Lidocaine w Epi 56mL, 8.4% Sodium Bicarbonate 5.6mL)    Medications  [x]Diazepam (Valium) 5mg PO, May Repeat x 1 for anxiety, relaxtion.    Post-Procedure:  Admission  [x]Post Procedure care - call physician for status update  []Admit to inpatient - Please document reason for admission in chart    Interventions require inpatient services    High risk of deterioration due to comorbidities    High risk of deterioration due to nature of admitting diagnosis  Location:    Vitals  [x]Vital signs per routine    Nursing Orders  [x]Thigh High Compression Stocking 20-30mm or 30-40mm to  [x]R extremity  [x]L extremity  [x]Check insertion site for bleeding or hematoma.  If bleeding or hematoma occurs, apply pressure and notify MD    Discharge  [x]Discharge home if no complications arise.  [x]Give post radiofrequency ablation discharge instructions to patient.  [x]Follow up venous ultrasound 72-96 hours after procedure.  [x]Follow up appointment with MD at 2 weeks.  [x]Contact MD for further questions

## 2021-06-15 NOTE — TELEPHONE ENCOUNTER
Spoke with patient and his wife regarding vein ablation procedure next week in [facility]. Advised to bring a . Explained it is ok to eat and drink prior to procedure with exception of blood thinner. Verified patient's insurance Medicare primary then ucare takes over(ucare denied until medicare is billed). We will supply patient with a thigh high compression sock. We carry from size medium to extra large. If patient doesn't fit into them they will need to provide their own. Questions answered. Advised we may try to do both legs on Tuesday and would cancel Wednesday then.Patient will call if any further questions.

## 2021-06-15 NOTE — PATIENT INSTRUCTIONS - HE
Resume normal activity with exception of no flight for one more week.  Use compression socks as much as possible when on feet, long car rides and on air planes.  Return to clinic in 4 months.  Call if any questions [phone]

## 2021-06-16 NOTE — TELEPHONE ENCOUNTER
Patient is calling stating that his inner thigh on the right side is very painful to the touch, please advise, patient will be available all day

## 2021-06-16 NOTE — TELEPHONE ENCOUNTER
Pt LOV was 3/16 post bilateral ablation. Per Dr. Stanley's note, some superficial phlebitis. Pt states that the pain/ tenderness to the R inner thiugh started about three days ago. No signs of infection. He is icing and wearing thigh high compression. Please advise.

## 2021-06-16 NOTE — PROGRESS NOTES
New compression script faxed to [facility] per patients request. Explained we will not be able to refill again unless seen by Dr Stanley. Patient will get further refills from primary.

## 2021-06-16 NOTE — TELEPHONE ENCOUNTER
Pt was updated per Dr. Stanley his symptoms are Phlebiitis. He was informed to continue to Ice and take. anti-inflammatories. Also continue the thigh high compression. Told to call back if his symptoms worsen.

## 2021-06-17 NOTE — TELEPHONE ENCOUNTER
Telephone Encounter by Joycelyn Garcia at 12/17/2020  1:05 PM     Author: Joycelyn Garcia Service: -- Author Type: Patient Access    Filed: 12/17/2020  1:07 PM Encounter Date: 12/11/2020 Status: Signed    : Joycelyn Garcia (Patient Access)

## 2021-06-18 NOTE — PATIENT INSTRUCTIONS - HE
Patient Instructions by Pavel Stanley MD at 1/6/2021  9:00 AM     Author: Pavel Stanley MD Service: -- Author Type: Physician    Filed: 1/6/2021  9:33 AM Encounter Date: 1/6/2021 Status: Signed    : Pavel Stanley MD (Physician)         Varicose Vein Pre-Procedure Instructions/Vein Ablation     You are scheduled for a varicose vein treatment on your legs. The following is some helpful information for you, in regards to your treatment.    **Important:  A  will be needed post procedure.  (Unable to use Taxi or Uber).     We will supply a thigh high compression stocking for you. Please bring your compression sock as we only have sizes medium to X-large.    Please be aware, it is not advised to fly within 3 weeks post procedure    Please wear comfortable clothing.  We recommend that you bring a change of under clothes; they may get stained by the cleansing solution.    Feel free to bring a personal music player or a CD to listen to during your procedure.    Take your routine medications as you normally would with exception to blood thinners. Aspirin is ok to continue.    If you take Warfarin, Xarelto, or Eliquis this will need to be HELD prior to procedure according to primary care provider/doctor or cardiology who prescribes this medication.    Please notify us if you take this medication.    It is ok to eat prior to this procedure.    Please allow 1- 2 hours for your appointment.    For any questions regarding your procedure please call   [phone] to speak with the nurse.    If you would like a [place] Estimate for your upcoming service/procedure contact Cost of Care Estimates at [phone], advocates are available Monday through Friday 8am - 5pm.    Radiofrequency Ablation Codes  74695 for first vein in either leg  02919 for second vein    Please have the following information available:  1. Patient name and date of birth  2. Insurance company, plan name, ID and group numbers  3.  Description of the service/procedure and the associated procedure code numbers, if available. If more than one (1) procedure code, indicate which will be the primary procedure code.   4. The facility where the service/procedure will be performed.  5. The name of the physician involved with the service/procedure.  6. Appointment date of service.  7. Telephone number to call with the information.    Radiofrequency Ablation (RFA) Treatment for Varicose Veins    Radiofrequency ablation (RFA) is a procedure to treat varicose veins. It uses heat created from radiofrequency (RF).    Varicose veins are swollen, enlarged veins. They happen most often in the legs. Varicose veins can develop when valves in your veins become damaged. This causes problems with blood flow. Over time, too much blood collects in your veins. The veins may bulge, twist, and stand out under your skin. They can also cause symptoms such as aching, cramping, or swelling in your legs.    During RFA treatment, RF heat is sent into your vein through a thin, flexible tube (catheter). This closes off blood flow in the main problem vein.         With RFA treatment, a catheter that contains RF heat is used to seal off the main problem vein.      Getting ready for your treatment  Follow any instructions from your healthcare provider.    Tell your provider if you:    Are pregnant or think you may be pregnant    Are breastfeeding    Smoke or use alcohol on a regular basis    Have any allergies or intolerances to certain medicines. Explain what reaction you have had to these medicines in the past.    Tell your provider about any medicines you are taking. You may need to stop taking all or some of these before the test. This includes:    Medicines that can thin your blood or prevent clotting (anticoagulants)    All prescription medicines    Over-the-counter medicines such as aspirin or ibuprofen    Street drugs    Herbs, vitamins, and other supplements    Follow any  directions you're given for not eating or drinking before the procedure.    The day of your treatment  The treatment takes 45 to 60 minutes. The entire treatment (including time to prepare and recover) takes about 1 to 3 hours. You can go home the same day. For the treatment:     You'll lie down on a hospital bed.    An imaging method, such as ultrasound, is used to guide the procedure.    The leg to be treated is injected with numbing medicine.    Once your leg is numb, a needle makes a small hole (puncture) in the vein to be treated.    The catheter with the RF heat source is inserted into your vein.    More numbing medicine may be injected around your vein.    Once the catheter is in the right position, it is then slowly drawn backward. As the catheter sends out heat, the vein is closed off.    In some cases, other side branch varicose veins may be removed or tied off through a few small cuts (incisions).    When the treatment is done, the catheter is removed. Pressure is applied to the insertion site to stop any bleeding. An elastic compression stocking or a bandage may then be put on your leg.    Recovering at home  Once at home, follow all the instructions you've been given. Be sure to:    Take all medicines as directed    Care for the catheter insertion site as directed    Check for signs of infection at the catheter insertion site (see below)    Wear elastic stockings or bandages as directed    Keep your legs raised (elevated) as directed    Walk a few times a day    Avoid heavy exercise, lifting, and standing for long periods as advised    Avoid air travel, hot baths, saunas, or whirlpools as advised    Call your healthcare provider  Call your healthcare provider if you have any of the following:    Fever of 100.4 F (38 C) or higher, or as directed by your provider    Chest pain or trouble breathing    Signs of infection at the catheter insertion site. These include increased redness or swelling  (inflammation), warmth, increasing pain, bleeding, or bad-smelling discharge.    Severe numbness or tingling in the treated leg    Severe pain or swelling in the treated leg       Follow-up  You'll have a follow-up visit with your healthcare provider within a week. An ultrasound will be done to check for problems, such as blood clots. Your provider will discuss further treatments with you, if needed.  Risks and possible complications   These include the following:    Bleeding    Infection    Blood clots    Damage to the nerves in the treated area    Irritation or burning of the skin over the treated vein    Treatment doesn't improve the look or the symptoms of the problem veins    Risks of any medicines used during the treatment      Date Last Reviewed: 5/1/2016 2000-2017 [employer]. [address] [place] [address]. All rights reserved. This information is not intended as a substitute for professional medical care. Always follow your healthcare professional's instructions.    Self-Care for Spider and Varicose Veins  Your healthcare provider may suggest that you try self-care. Exercising and maintaining a healthy weight may keep problem veins from getting worse. Wearing elastic stockings and elevating your legs can help improve blood flow. Taking breaks when you sit or stand helps, too.         The top of the elastic stocking should be below the bend in your knee for a proper fit.      Exercising  Exercising is good for your veins because it improves blood flow. Walking, cycling, or swimming are great exercises for vein health. But be sure to check with your healthcare provider before starting any exercise program. Also, keep these hints in mind:    When exercising, start out slowly and try to build up to 30 minutes on most days.    Elevate your legs above heart level after exercise to keep blood from pooling in veins.    Maintaining a healthy weight  Being overweight puts extra pressure  on your veins. To maintain a healthy weight, try these tips:    Choose lean meats, fish, and skinless chicken.    Use low-fat dairy products.    Eat foods high in fiber, such as whole grains, fruits, and vegetables.    Cut down on sugar, salt, and saturated and trans fats.    Exercise regularly.    Wearing elastic stockings  Elastic stockings gently squeeze veins so blood flows upward. If you need elastic stockings, your healthcare provider can prescribe them for you. Follow your healthcare provider's advice about how and when to wear them. Elastic stockings come in several different levels of pressure. Ask your healthcare provider which level of pressure would benefit you the most.     Elevating your legs  Raising your legs above heart level will help relieve swelling and keep blood from pooling in veins. Try to elevate your legs for 15 to 20 minutes at the end of the day, and whenever you're relaxing. To make sure your legs are raised above heart level, prop them up on cushions or large pillows.    When sitting and standing  To keep blood moving when you have to sit or stand for long periods, try these tips:    At work, take walking breaks instead of coffee breaks. Walk during your lunch hour. Or try flexing your feet up and down 10 times each hour.    When standing, raise yourself up and down on your toes, or rock back and forth on your heels.    Date Last Reviewed: 5/1/2016 2000-2017 [employer]. [address] [place] [address]. All rights reserved. This information is not intended as a substitute for professional medical care. Always follow your healthcare professional's instructions.    [facility]  [address]  [place] [address]    [place] [phone]  [phone]  [place] [phone]  [email or web address]    [facility]   [address]             [address]                 [address]     [place] [address]      [place] [address]         [place] [address]  [phone]  [phone]            [phone]                Fax: [phone]  [email or web address]                                              [facility]  [address]  [place], [place] [address] [phone]  [phone]  [email or web address]    Sandie Vasques  [phone]  [email or web address]    [facility]   [phone]    [facility]  [address] [place] [address] [phone]

## 2021-06-21 NOTE — LETTER
Letter by Pavel Stanley MD at      Author: Pavel Stanley MD Service: -- Author Type: --    Filed:  Encounter Date: 3/16/2021 Status: (Other)         Deann Crandall MD  [address] [address]                                  March 16, 2021    Patient: Mauro Tamez   MR Number: [id number]   YOB: 1955   Date of Visit: 3/16/2021     Dear Dr. Raz MD:    Thank you for referring Mauro Tamez to me for evaluation. Below are the relevant portions of my assessment and plan of care.    If you have questions, please do not hesitate to call me. I look forward to following Mauro along with you.    Sincerely,        Pavel Stanley MD          CC  No Recipients  Pavel Stanley MD  3/16/2021  9:21 AM  Sign when Signing Visit  Post Procedure Note Endovenous Closure    S: Mauro Tamez is a 65 y.o. male S/P Bilateral  leg endovenous closure ofBilateral lower ext. Two weeks out from procedure. Doing well, wore male  thigh high socks. Minimal discomfort. Some superficial phlibitis. Which is resolving.     O:   Vitals:    03/16/21 0919   Pulse: (!) 58   Temp: 98.8  F (37.1  C)   TempSrc: Temporal   SpO2: 96%       General: no apparent distress  Legs look good no signs of infection, incisions healing nicely.    Imaging:    US Venous Post Ablation Legs Bilateral (Order [id number])  Imaging  Date: 3/5/2021 Department: [facility]3] Released By: Dominique Fuchs Authorizing: Pavel Stanley MD   Study Result    Bilateral Venous Ultrasound Status Post Radiofrequency Ablation (03/05/21)        Indication: Follow-up saphenous vein Radiofrequency ablation     Date of Procedure: Right 03/02/2021   Left 03/02/2021      Right CFV/SFJ Compression:  Fully Compressible     Left CFV/SFJ Compression:  Fully Compressible     Reference:   (FC)-Fully Compressible           (PC)-Partially Compressible   (NC) Non-Compressible     Location Right AASV Left AASV    Proximal Thigh NC NC   Mid Thigh NC NC   Distal Thigh FC FC      Impression: Successful radiofrequency ablation of right leg anterior accessory saphenous vein from proximal thigh to distal thigh.  Successful radiofrequency ablation of left leg anterior accessory saphenous vein from proximal thigh to distal thigh.  No evidence of DVT in either common femoral vein.         A/P: S/p endovenous closure. For insuffiencey of veins     May switch to knee high support socks   Resume all activities   RTC 4 months   Call for any questions or concerns     Pavel Stanley MD   [facility] Surgery

## 2021-06-21 NOTE — LETTER
Letter by Pavel Stanley MD at      Author: Pavel Stanley MD Service: -- Author Type: --    Filed:  Encounter Date: 1/6/2021 Status: (Other)         Deann Crandall MD  [address] [address]                                  January 6, 2021    Patient: Mauro Tamez   MR Number: [id number]   YOB: 1955   Date of Visit: 1/6/2021     Dear Dr. Raz MD:    Thank you for referring Mauro Tamez to me for evaluation. Below are the relevant portions of my assessment and plan of care.    If you have questions, please do not hesitate to call me. I look forward to following Mauro along with you.    Sincerely,        Pavel Stanley MD          CC  No Recipients  Pavel Stanley MD  1/6/2021  1:58 PM  Sign when Signing Visit      [facility] Vein Consult      Assessment:     1. varicose veins, bilateral   2. spider veins, bilateral   3. Stasis changes bilateral  4.. Insuffiencey of right acessory saphenous vein, left acessory saphenous vein    Plan:     1. Treatment options of conservative therapy of stockings use, exercise, weight loss, elevating legs when possible.    2. Script for compression stockings 20-30 mm hg  3. Ultrasound to evaluate legs for incompetency of both deep and superficial system .   4. Surgical treatment   Endovenous closure ofbilateral, acessory saphenous vein     Risks and benefits of surgical intervention including infection, burns, dvt, thrombophlebitis, not closing, recurrence, numbness and nerve injury and need for further intervention were all discused    5. Follow up: for procedure if approved.   6. Call for any questions concerns or issues    Subjective:      Mauro aTmez is a 65 y.o. male  who was referred by Deann Crandall MD  for evaluation of varicose veins. Symptoms include pain, aching, fatigue, burning, edema, dermatitis, external bleeding and episodes of superficial thrombophlebitis. Patient has history of leg selling, pain  and vein issues that have progressed. Pain and symptoms have affected daily living and work activities needing medications. Here for evaluation today. Stocking use with compression stockings of 20-30 mm hg or greater for greater then 3 months. He has under gone endovenous closure of greater saphenous veins in the past. Did well for many years but recenet change and increased veins and bleeding for lower area off and on.     Allergies:Singulair [montelukast]    Past Medical History:   Diagnosis Date   ? Hepatitis C    ? HIV disease (H)    ? Sinusitis        No past surgical history on file.    Current Outpatient Medications   Medication Sig Note   ? albuterol (PROAIR HFA) 90 mcg/actuation inhaler Inhale 1-2 puffs. 12/23/2014: Received from: [facility]   ? buprenorphine HCl (SUBUTEX) 2 mg Subl Place 4 mg under the tongue. 12/23/2014: Received from: [facility]   ? calcium carbonate-mag oxide (OYSTER SHELL CALCIUM & MAG) 250-155 mg Tab Take by mouth. 12/23/2014: Received from: [facility]   ? codeine-guaiFENesin (GUAIFENESIN AC)  mg/5 mL liquid Take 5 mL by mouth. 12/23/2014: Received from: [facility]   ? cyanocobalamin 1,000 mcg/mL injection 1 ML every 10 days IM 12/23/2014: Received from: [facility]   ? diazepam (VALIUM) 2 MG tablet Take 5 mg by mouth. 12/23/2014: Received from: [facility]   ? emtricitabine-tenofovir (TRUVADA) 200-300 mg per tablet Take 1 tablet by mouth. 12/23/2014: Received from: [facility]   ? emtricitabine-tenofovir alafenamide (DESCOVY) 200-25 mg Tab tablet Take 1 tablet by mouth.    ? EMTRICITABINE/TENOFOVIR (TRUVADA ORAL) Take by mouth.    ? fluticasone propionate (FLONASE) 50 mcg/actuation nasal spray 2 sprays into each nostril.    ? ibuprofen (ADVIL,MOTRIN) 400 MG tablet Take 400 mg by mouth. 12/23/2014: Received from: [facility]   ? loratadine (CLARITIN) 10 mg tablet Take 10 mg by mouth.    ?  "LORAZEPAM (ATIVAN ORAL) Take by mouth.    ? LORazepam (ATIVAN) 1 MG tablet Take 1 mg by mouth. 12/23/2014: Received from: [facility]   ? miconazole (MICOTIN) 2 % cream Apply topically. 12/23/2014: Received from: [facility]   ? miscellaneous medical supply Misc As directed. TENS Unit. For right shoulder and back therapy 12/23/2014: Received from: [facility]   ? multivitamin (ONE A DAY) per tablet Take 1 tablet by mouth. 12/23/2014: Received from: [facility]   ? naproxen sodium (ALEVE) 220 MG tablet Take 220 mg by mouth.    ? oxyCODONE (OXY-IR) 5 mg capsule Take 5 mg by mouth. 12/23/2014: Received from: [facility]   ? OXYCODONE HCL (ROXICODONE ORAL) Take by mouth.    ? prednisoLONE acetate (PRED FORTE) 1 % ophthalmic suspension 2-4 drops each nostril 1-2 times daily and manuver to distribute the solution throughout the nasal cavity 12/23/2014: Received from: [facility]   ? predniSONE (DELTASONE) 20 MG tablet 1 tablet by mouth daily for 5 days. 12/23/2014: Received from: [facility]   ? raltegravir (ISENTRESS) 400 mg tablet Take 400 mg by mouth. 12/23/2014: Received from: [facility]   ? syringe with needle, disp, 1 mL 27 x 1/2\" Syrg As directed. 12/23/2014: Received from: [facility]   ? testosterone (ANDROGEL) 20.25 mg/1.25 gram (1.62 %) GlPm Place 60.75 mg on the skin. 12/23/2014: Received from: [facility]   ? tobramycin (TOBREX) 0.3 % ophthalmic solution Apply 1 drop to eye.        No family history on file.     reports that he quit smoking about 14 years ago. His smoking use included cigarettes. He has a 30.00 pack-year smoking history. He has never used smokeless tobacco. He reports that he does not drink alcohol or use drugs.      Review of Systems:  Pertinent items are noted in HPI.  A 12 point comprehensive review of systems was negative except as noted.   Patient has symptomatic veins and changes of " bilateral legs. These have progressed to the point of causing symptoms on a daily basis. This causes issues with daily activities and chores such as mowing lawn, outdoor upkeep and standing for long lengths of time       Objective:     Vitals:    01/06/21 0912   BP: 124/82   Pulse: 64   Temp: 98.3  F (36.8  C)   TempSrc: Oral     There is no height or weight on file to calculate BMI.    EXAM:  GENERAL: This is a well-developed 65 y.o. male who appears his stated age  HEAD: normocephalic  HEENT: Pupils equal and reactive bilaterally  MOUTH: mucus membranes intact. Normal dentation  CARDIAC: RRR without murmur  CHEST/LUNG:  Clear to auscultation bilaterally  ABDOMEN: Soft, nontender, nondistended, no masses noted   NEUROLOGIC: Focally intact, nonfocal, alert and oriented x 3  INTEGUMENT: No open lesions or ulcers  VASCULAR: Pulses intact, symmetrical upper and lower extremities. There areskin changes consistent with chronic venous insufficiency. Varicose veins present in bilateral greater saphenous distribution. Spider veins present bilateral.        Imaging:    US Venous Insufficiency Legs Bilateral (Order [id number])  Imaging  Date: 1/6/2021 Department: [facility] Released By: Jacques Paniagua, HEBER, RVT Authorizing: Pavel Stanley MD   Study Result    BILATERAL Venous Insufficiency Ultrasound     BILATERAL Lower Extremity          Indication:  Symptomatic varicose veins /pain/swelling     Previous: 10/11/16 and 10/12/16- bilateral GSV VNUS ablation     Patient History: Swelling, Stasis, Vein Ablation and DVT     Presenting Symptoms:  Swelling, Varicose Veins, Pain and Stasis     Technique:   Supine and Upright Ultrasound of the Deep and Superficial Veins with Valsalva and Compression Augmentation Maneuvers. Duplex Imaging is performed utilizing gray-scale, Two-dimensional images, color-flow imaging, Doppler waveform analysis, and Spectral doppler imaging done with provacative  maneuvers.      Incompetency Criteria:  Deep vein reflux reported when greater than 1000 msec flow reversal. Superficial vein reflux reported when equal to or greater than 600 msec flow reversal.  vein reflux reported as greater than 350 msec flow reversal.      Right  Leg Deep Veins    CFV SFJ PFV PROX SFV   PROX SFV MID SFV DIST POP V. PERON.   V. PTV'S   Compressibility  (FC,PC,NC) FC FC FC FC FC FC FC FC FC   Spontaneous +   + + + + +   +   Phasic  +   + + + + +   +   Augmentation +   + + + + +   +   Reflux -   - - - - -   -         Right Leg Saphenous Veins  Location SFJ PROX THIGH KNEE MID CALF SPJ PROX CALF MID CALF   RT GSV (mm) 9 NV NV 4         Reflux - NV NV +         RT SSV (mm)         6 5 4   Reflux         - - -      Location SFJ PROX THIGH MID THIGH   RT AASV (mm) 12 7 6   Reflux + + +      Left Leg Deep Veins    CFV SFJ PFV PROX SFV PROX SFV MID SFV DIST POP V. PERON. V. PTV'S   Compressibility  (FC,PC,NC) FC FC FC FC FC FC FC FC FC   Spontaneous +   + + + + +   +   Phasic  +   + + + + +   +   Augmentation +   + + + + +   +   Reflux +   - + + + +   -         Lt Leg Saphenous Veins   Location SFJ PROX THIGH KNEE MID CALF SPJ PROX CALF MID CALF   LT GSV (mm) 6 NV NNV 3         Reflux - NV NV -         LT SSV (mm)         5 3 3   Reflux         - - -      Location SFJ PROX THIGH MID THIGH   LT AASV (mm) 6 5 3   Reflux + + +      Compression Study:  Normal     Comments: No incompetent  veins seen.     Impression:     Reference:     Compressibility: FC= Fully compressible, PC= Partially compressible, NC= Non-compressible, NV= Not Visualized     Venous Doppler: (+) = Present  (0) = Absent  (-) = Decreased/Unable to Evaluate, (NV) = Not Visualized     Reflux: (+) Incompetent  (-) Competent, (NV) = Not Visualized     Interpretation criteria:      Duration of Retrograde flow (milliseconds)  Category Deep Veins Superficial Veins  veins   Competent < 1000ms < 600ms < 350ms    Incompetent > 1000ms > 600ms > 350ms             Pavel Stanley MD  General Surgery [phone]  Vascular Surgery [phone]

## 2021-06-30 NOTE — PROGRESS NOTES
Progress Notes by Pavel Stanley MD at 1/6/2021  9:00 AM     Author: Pavel Stanley MD Service: -- Author Type: Physician    Filed: 1/6/2021  2:05 PM Encounter Date: 1/6/2021 Status: Signed    : Pavel Stanley MD (Physician)           [facility] Vein Consult      Assessment:     1. varicose veins, bilateral   2. spider veins, bilateral   3. Stasis changes bilateral  4.. Insuffiencey of right acessory saphenous vein, left acessory saphenous vein    Plan:     1. Treatment options of conservative therapy of stockings use, exercise, weight loss, elevating legs when possible.    2. Script for compression stockings 20-30 mm hg  3. Ultrasound to evaluate legs for incompetency of both deep and superficial system .   4. Surgical treatment   Endovenous closure ofbilateral, acessory saphenous vein     Risks and benefits of surgical intervention including infection, burns, dvt, thrombophlebitis, not closing, recurrence, numbness and nerve injury and need for further intervention were all discused    5. Follow up: for procedure if approved.   6. Call for any questions concerns or issues    Subjective:      Mauro Tamez is a 65 y.o. male  who was referred by Deann Crandall MD  for evaluation of varicose veins. Symptoms include pain, aching, fatigue, burning, edema, dermatitis, external bleeding and episodes of superficial thrombophlebitis. Patient has history of leg selling, pain and vein issues that have progressed. Pain and symptoms have affected daily living and work activities needing medications. Here for evaluation today. Stocking use with compression stockings of 20-30 mm hg or greater for greater then 3 months. He has under gone endovenous closure of greater saphenous veins in the past. Did well for many years but recenet change and increased veins and bleeding for lower area off and on.     Allergies:Singulair [montelukast]    Past Medical History:   Diagnosis Date   ? Hepatitis C    ? HIV  disease (H)    ? Sinusitis        No past surgical history on file.    Current Outpatient Medications   Medication Sig Note   ? albuterol (PROAIR HFA) 90 mcg/actuation inhaler Inhale 1-2 puffs. 12/23/2014: Received from: [facility]   ? buprenorphine HCl (SUBUTEX) 2 mg Subl Place 4 mg under the tongue. 12/23/2014: Received from: [facility]   ? calcium carbonate-mag oxide (OYSTER SHELL CALCIUM & MAG) 250-155 mg Tab Take by mouth. 12/23/2014: Received from: [facility]   ? codeine-guaiFENesin (GUAIFENESIN AC)  mg/5 mL liquid Take 5 mL by mouth. 12/23/2014: Received from: [facility]   ? cyanocobalamin 1,000 mcg/mL injection 1 ML every 10 days IM 12/23/2014: Received from: [facility]   ? diazepam (VALIUM) 2 MG tablet Take 5 mg by mouth. 12/23/2014: Received from: [facility]   ? emtricitabine-tenofovir (TRUVADA) 200-300 mg per tablet Take 1 tablet by mouth. 12/23/2014: Received from: [facility]   ? emtricitabine-tenofovir alafenamide (DESCOVY) 200-25 mg Tab tablet Take 1 tablet by mouth.    ? EMTRICITABINE/TENOFOVIR (TRUVADA ORAL) Take by mouth.    ? fluticasone propionate (FLONASE) 50 mcg/actuation nasal spray 2 sprays into each nostril.    ? ibuprofen (ADVIL,MOTRIN) 400 MG tablet Take 400 mg by mouth. 12/23/2014: Received from: [facility]   ? loratadine (CLARITIN) 10 mg tablet Take 10 mg by mouth.    ? LORAZEPAM (ATIVAN ORAL) Take by mouth.    ? LORazepam (ATIVAN) 1 MG tablet Take 1 mg by mouth. 12/23/2014: Received from: [facility]   ? miconazole (MICOTIN) 2 % cream Apply topically. 12/23/2014: Received from: [facility]   ? miscellaneous medical supply Misc As directed. TENS Unit. For right shoulder and back therapy 12/23/2014: Received from: [facility]   ? multivitamin (ONE A DAY) per tablet Take 1 tablet by mouth. 12/23/2014: Received from: [facility]   ? naproxen sodium (ALEVE) 220  "MG tablet Take 220 mg by mouth.    ? oxyCODONE (OXY-IR) 5 mg capsule Take 5 mg by mouth. 12/23/2014: Received from: [facility]   ? OXYCODONE HCL (ROXICODONE ORAL) Take by mouth.    ? prednisoLONE acetate (PRED FORTE) 1 % ophthalmic suspension 2-4 drops each nostril 1-2 times daily and manuver to distribute the solution throughout the nasal cavity 12/23/2014: Received from: [facility]   ? predniSONE (DELTASONE) 20 MG tablet 1 tablet by mouth daily for 5 days. 12/23/2014: Received from: [facility]   ? raltegravir (ISENTRESS) 400 mg tablet Take 400 mg by mouth. 12/23/2014: Received from: [facility]   ? syringe with needle, disp, 1 mL 27 x 1/2\" Syrg As directed. 12/23/2014: Received from: [facility]   ? testosterone (ANDROGEL) 20.25 mg/1.25 gram (1.62 %) GlPm Place 60.75 mg on the skin. 12/23/2014: Received from: [facility]   ? tobramycin (TOBREX) 0.3 % ophthalmic solution Apply 1 drop to eye.        No family history on file.     reports that he quit smoking about 14 years ago. His smoking use included cigarettes. He has a 30.00 pack-year smoking history. He has never used smokeless tobacco. He reports that he does not drink alcohol or use drugs.      Review of Systems:  Pertinent items are noted in HPI.  A 12 point comprehensive review of systems was negative except as noted.   Patient has symptomatic veins and changes of bilateral legs. These have progressed to the point of causing symptoms on a daily basis. This causes issues with daily activities and chores such as mowing lawn, outdoor upkeep and standing for long lengths of time       Objective:     Vitals:    01/06/21 0912   BP: 124/82   Pulse: 64   Temp: 98.3  F (36.8  C)   TempSrc: Oral     There is no height or weight on file to calculate BMI.    EXAM:  GENERAL: This is a well-developed 65 y.o. male who appears his stated age  HEAD: normocephalic  HEENT: Pupils equal and reactive " bilaterally  MOUTH: mucus membranes intact. Normal dentation  CARDIAC: RRR without murmur  CHEST/LUNG:  Clear to auscultation bilaterally  ABDOMEN: Soft, nontender, nondistended, no masses noted   NEUROLOGIC: Focally intact, nonfocal, alert and oriented x 3  INTEGUMENT: No open lesions or ulcers  VASCULAR: Pulses intact, symmetrical upper and lower extremities. There areskin changes consistent with chronic venous insufficiency. Varicose veins present in bilateral greater saphenous distribution. Spider veins present bilateral.        Imaging:    US Venous Insufficiency Legs Bilateral (Order [id number])  Imaging  Date: 1/6/2021 Department: [facility] [place] Released By: Jacques Paniagua, HEBER, RVT Authorizing: Pavel Stanley MD   Study Result    BILATERAL Venous Insufficiency Ultrasound     BILATERAL Lower Extremity          Indication:  Symptomatic varicose veins /pain/swelling     Previous: 10/11/16 and 10/12/16- bilateral GSV VNUS ablation     Patient History: Swelling, Stasis, Vein Ablation and DVT     Presenting Symptoms:  Swelling, Varicose Veins, Pain and Stasis     Technique:   Supine and Upright Ultrasound of the Deep and Superficial Veins with Valsalva and Compression Augmentation Maneuvers. Duplex Imaging is performed utilizing gray-scale, Two-dimensional images, color-flow imaging, Doppler waveform analysis, and Spectral doppler imaging done with provacative maneuvers.      Incompetency Criteria:  Deep vein reflux reported when greater than 1000 msec flow reversal. Superficial vein reflux reported when equal to or greater than 600 msec flow reversal.  vein reflux reported as greater than 350 msec flow reversal.      Right  Leg Deep Veins    CFV SFJ PFV PROX SFV   PROX SFV MID SFV DIST POP V. PERON.   V. PTV'S   Compressibility  (FC,PC,NC) FC FC FC FC FC FC FC FC FC   Spontaneous +   + + + + +   +   Phasic  +   + + + + +   +   Augmentation +   + + + + +   +    Reflux -   - - - - -   -         Right Leg Saphenous Veins  Location SFJ PROX THIGH KNEE MID CALF SPJ PROX CALF MID CALF   RT GSV (mm) 9 NV NV 4         Reflux - NV NV +         RT SSV (mm)         6 5 4   Reflux         - - -      Location SFJ PROX THIGH MID THIGH   RT AASV (mm) 12 7 6   Reflux + + +      Left Leg Deep Veins    CFV SFJ PFV PROX SFV PROX SFV MID SFV DIST POP V. PERON. V. PTV'S   Compressibility  (FC,PC,NC) FC FC FC FC FC FC FC FC FC   Spontaneous +   + + + + +   +   Phasic  +   + + + + +   +   Augmentation +   + + + + +   +   Reflux +   - + + + +   -         Lt Leg Saphenous Veins   Location SFJ PROX THIGH KNEE MID CALF SPJ PROX CALF MID CALF   LT GSV (mm) 6 NV NNV 3         Reflux - NV NV -         LT SSV (mm)         5 3 3   Reflux         - - -      Location SFJ PROX THIGH MID THIGH   LT AASV (mm) 6 5 3   Reflux + + +      Compression Study:  Normal     Comments: No incompetent  veins seen.     Impression:     Reference:     Compressibility: FC= Fully compressible, PC= Partially compressible, NC= Non-compressible, NV= Not Visualized     Venous Doppler: (+) = Present  (0) = Absent  (-) = Decreased/Unable to Evaluate, (NV) = Not Visualized     Reflux: (+) Incompetent  (-) Competent, (NV) = Not Visualized     Interpretation criteria:      Duration of Retrograde flow (milliseconds)  Category Deep Veins Superficial Veins  veins   Competent < 1000ms < 600ms < 350ms   Incompetent > 1000ms > 600ms > 350ms             Pavel Stanley MD  General Surgery [phone]  Vascular Surgery [phone]

## 2021-07-03 NOTE — ADDENDUM NOTE
Addendum Note by Carlos Toussaint RN at 12/17/2020  3:14 PM     Author: Carlos Toussaint RN Service: -- Author Type: Registered Nurse    Filed: 12/17/2020  3:14 PM Encounter Date: 12/11/2020 Status: Signed    : Carlos Toussaint RN (Registered Nurse)    Addended by: CARLOS TOUSSAINT on: 12/17/2020 03:14 PM        Modules accepted: Orders

## 2021-07-13 ENCOUNTER — OFFICE VISIT (OUTPATIENT)
Dept: VASCULAR SURGERY | Facility: CLINIC | Age: 66
End: 2021-07-13
Payer: MEDICARE

## 2021-07-13 VITALS — SYSTOLIC BLOOD PRESSURE: 110 MMHG | DIASTOLIC BLOOD PRESSURE: 70 MMHG | TEMPERATURE: 99.2 F | HEART RATE: 56 BPM

## 2021-07-13 DIAGNOSIS — I83.813 VARICOSE VEINS OF LOWER EXTREMITY WITH PAIN, BILATERAL: Primary | ICD-10-CM

## 2021-07-13 DIAGNOSIS — I83.893 VARICOSE VEINS OF BILATERAL LOWER EXTREMITIES WITH OTHER COMPLICATIONS: ICD-10-CM

## 2021-07-13 PROCEDURE — 99213 OFFICE O/P EST LOW 20 MIN: CPT | Performed by: SPECIALIST

## 2021-07-13 PROCEDURE — G0463 HOSPITAL OUTPT CLINIC VISIT: HCPCS

## 2021-07-13 RX ORDER — VITAMIN E (DL,TOCOPHERYL ACET) 180 MG
CAPSULE ORAL
COMMUNITY

## 2021-07-13 ASSESSMENT — PAIN SCALES - GENERAL: PAINLEVEL: NO PAIN (0)

## 2021-07-13 NOTE — LETTER
[facility]  [address]  [place] [address]  Phone: [phone]  Fax: [phone]      July 14, 2021    No referring provider defined for this encounter.    Regarding:  Name:  Mauro Tamez  Address:  [address] [place] [id number]  YOB: 1955    Dear Doctor,    [facility] Surgery Follow up    HPI:    65 year old year old adult who returns for a follow up.  Here for follow-up of closure.  He has had multiple closures with me in the past decrease in size of veins has decrease in symptoms still has some varicosities odor noted.  He is here for 4-month follow-up and continue to wear his pressure compression on a regular basis daily.  Allergies:Singulair [montelukast]    Past Medical History:   Diagnosis Date     Hepatitis C      HIV disease (H)      Sinusitis        History reviewed. No pertinent surgical history.    CURRENT MEDS:  Current Outpatient Medications   Medication     buprenorphine (SUBUTEX) 2 MG SUBL sublingual tablet     emtricitabine-tenofovir (TRUVADA) 200-300 MG per tablet     Magnesium Oxide 500 MG CAPS     naproxen (NAPROSYN) 500 MG tablet     Raltegravir Potassium (ISENTRESS PO)     No current facility-administered medications for this visit.       History reviewed. No pertinent family history.     reports that Mauro Tamez quit smoking about 28 years ago. Mauro Tamez's smoking use included cigarettes. Mauro Tamez has never used smokeless tobacco.    Review of Systems:  Negative except still has some veins but these are not bothering him or causing troubles.  Otherwise normal state of health.  He has lived with HIV for 20+ years. Otherwise twelve system of review is negative.      OBJECTIVE:  Vitals:    07/13/21 0930   BP: 110/70   Pulse: 56   Temp: 99.2  F (37.3  C)     There is no height or weight on file to calculate BMI.    EXAM:  GENERAL: This is a well-developed 65 year old adult who appears  Mauro Tamez's stated age  HEAD: normocephalic  HEENT: Pupils equal and reactive bilaterally  CARDIAC: RRR without murmur  CHEST/LUNG:  Clear to auscultation  ABDOMEN: Soft, nontender, nondistended, no masses    NEUROLOGIC: Focally intact, nonfocal  VASCULAR: Pulses intact, symmetrical upper and lower extremities.  Bilateral varicose veins        LABS:  Lab Results   Component Value Date    WBC 5.0 02/18/2021    HGB 14.0 02/18/2021    HCT 40.7 02/18/2021    MCV 98 02/18/2021     02/18/2021       No results found for: HGBA1C  Lab Results   Component Value Date    ALT 24 02/18/2021    AST 24 02/18/2021    ALKPHOS 51 02/18/2021        Images: reviewed old ultrasounds and studies.     Assessment/Plan:   Significant varicose veins bilaterally    Patient of note for many many years we have closed all the vessels were taken close from a that standpoint is now 4 months out from that and I think good results usually he has decrease in size of veins around the area of the vessel to close which he still does he still has varicosities of both lower legs no open ulcers or sores no chronic hemosiderin changes he wears compression daily we discussed we can do stab phlebectomy each have significant blood loss to be laid up for a while time.  And he at this time point is to get a hold off on that but happy to do so if he needs to have this done in the future with more problems or issues.  He is comfortable with his present plan and will see me back in the future if he has issues.    Return if symptoms worsen or fail to improve.     Pavel Stanley MD ,MD  [facility] Department of Surgery

## 2021-07-13 NOTE — PATIENT INSTRUCTIONS
"We are prescribing some compression stockings for you. I have included different suppliers that should help you get measured and fitting to ensure proper fitting socks. You should wear these socks as much as you can. It is especially important to wear them with long periods of sitting/standing, long car rides or if you will be flying. Compression socks should get refilled every 4-6 months. They do not need to be worn at night while in bed.    If you do a lot of standing it is good to do calf raises to help keep the blood pumping. If you sit a lot at work it is good to get up periodically to walk around. Elevation of the foot of your bed 4-6\" helps the blood return back to where it is needed.    Varicose Veins  Varicose veins are swollen, enlarged veins most often found in the legs. They are usually blue or purple in color and may bulge, twist, and stand out under the skin.  Normally, veins return blood from the body to the heart. The leg veins have one-way valves that prevent blood from flowing backward in the vein. When the valves are weak or damaged, blood backs up in the veins. This may cause some of the veins to swell and bulge and become varicose veins.  Symptoms  Varicose veins may or may not cause symptoms. If symptoms do occur, they can include:    Legs that feel tired, achy, heavy, or itchy    Leg muscle cramps    Skin changes, such as discoloration, dryness, redness, or rash (in more severe cases, you may also have sores on the skin called venous leg ulcers)  Risk Factors  There are a number of factors that increase the risk for varicose veins. These can include:    Being a woman    Being older    Sitting or standing for long periods    Being overweight    Being pregnant    Having a family history of varicose veins  Treatment begins with simple self-help measures (see below). If these don t help, there are many procedures that can be done to shrink or remove varicose veins. Your healthcare provider can " tell you more about these options, if needed.  Home care    Support or compression stockings will likely be prescribed. If so, be sure to wear them as directed. They may help improve blood flow.    Exercising helps strengthen your leg muscles and improve blood flow. To get the most benefit, choose exercises such as walking, swimming, or cycling. Also try to exercise for at least 30 minutes on most days.    Raising (elevating) your legs lets gravity help blood flow back to the heart. Sit or lie with your feet above heart level a few times throughout the day, or as directed.    Avoid long periods of sitting or standing. Change positions often. Also, move your ankles, toes and knees often. This may also help improve blood flow.    If you are overweight, talk with your healthcare provider about setting up a weight-loss plan. Maintaining a healthy weight can help reduce the strain on your veins. It may also improve symptoms, such as swelling and aching.    If you have dryness and itching, ask your provider about special lotions that can be applied to the skin to help improve symptoms.  Follow-up care  Follow up with your healthcare provider, or as directed. If imaging tests were done, you ll be told the results and if there are any new findings that affect your care.  When to seek medical advice  Call your healthcare provider right away if any of these occur:    Sudden, severe leg swelling, pain, or redness    Symptoms worsen, or they don t improve with self-care    Bleeding from any affected veins    Ulcers form on the legs, ankles, or feet    Fever of 100.4 F (38 C) or higher, or as advised by your provider  Understanding Spider and Varicose Veins  Do you often hide your legs because of the way they look? You may have noticed tiny red or blue bursts (spider veins). Or maybe you have veins that bulge or look twisted (varicose veins). If so, there are treatments that can help  What are the symptoms?  Spider veins or  varicose veins may never be a problem. But sometimes they can cause legs to ache or swell. Your legs may also feel heavy and tired, or like they re burning. These symptoms may be more severe at the end of the day. Prolonged sitting or standing can also make your symptoms worse.  Who gets spider and varicose veins?  Anyone can get spider or varicose veins. But vein problems tend to be hereditary (run in families). Other factors that can affect veins include:    Pregnancy, hormones, and birth control pills    A job where you stand or sit a lot    Extra weight or lack of exercise    Age                    What can be done?  Spider and varicose veins can affect the way you feel about yourself. Talk to your healthcare provider about your concerns. There are treatments that can ease symptoms and make your legs look better.  Your treatment choices  Treatment may include self-care, sclerotherapy (injecting veins with a chemical), surgery, or newer nonsurgical minimally invasive therapies. Spider veins and some varicose veins can be treated with sclerotherapy. Large varicose veins can often be treated with newer minimally invasive procedures and, in rare cases, surgery may be needed.     Please call [facility] to schedule an appointment. If you received a prescription please bring it with you to your appointment. You may call one of the locations below, although some locations are limited to what they carry.    Office Locations  New Locations  [facility]  Home Medical Equipment  [address] [address]  [facility] ([facility])  [address], [place] [address]  Phone [phone] /Fax [phone]    [facility]   [address]   Medical Equipment [address]/Orthotics and Prosthetics [address]  [place] [address]   Phone: [phone]  Fax [phone]    [facility]  [facility]  [address]  [place] [address]  Phone: [phone]  Fax: [phone]    [facility]   [address]  [place] [address]  Phone: [phone]  Fax: [phone]    [facility]  [address]  [place] [address]  Phone: [phone]  Fax: [phone]    [facility]  [address]  [place] [address]  Phone: [phone]  Fax: [phone]    [facility]  [address]   [place] [address]   Phone: [phone]  Fax: [phone]    [place]   [address] [place] [address]   Phone: [phone]  Fax: [phone]    [facility]   [address]             [address]                 [address]     [place] [address]      [place] [address]         [place] [address]  [phone]  [phone]     [phone]               Fax: [phone]  [email or web address]                                                [facility]  [address] [place] [address]  [phone]  [phone]  [email or web address]    Sandie Vasques  [phone]  [email or web address]    [facility]   [phone]    [facility]  [address] [place] [address]  [phone]

## 2021-07-14 NOTE — PROGRESS NOTES
[facility] Surgery Follow up    HPI:    65 year old year old adult who returns for a follow up.  Here for follow-up of closure.  He has had multiple closures with me in the past decrease in size of veins has decrease in symptoms still has some varicosities odor noted.  He is here for 4-month follow-up and continue to wear his pressure compression on a regular basis daily.  Allergies:Singulair [montelukast]    Past Medical History:   Diagnosis Date     Hepatitis C      HIV disease (H)      Sinusitis        History reviewed. No pertinent surgical history.    CURRENT MEDS:  Current Outpatient Medications   Medication     buprenorphine (SUBUTEX) 2 MG SUBL sublingual tablet     emtricitabine-tenofovir (TRUVADA) 200-300 MG per tablet     Magnesium Oxide 500 MG CAPS     naproxen (NAPROSYN) 500 MG tablet     Raltegravir Potassium (ISENTRESS PO)     No current facility-administered medications for this visit.       History reviewed. No pertinent family history.     reports that Mauro Tamez quit smoking about 28 years ago. Mauro Tamez's smoking use included cigarettes. Mauro Tamez has never used smokeless tobacco.    Review of Systems:  Negative except still has some veins but these are not bothering him or causing troubles.  Otherwise normal state of health.  He has lived with HIV for 20+ years. Otherwise twelve system of review is negative.      OBJECTIVE:  Vitals:    07/13/21 0930   BP: 110/70   Pulse: 56   Temp: 99.2  F (37.3  C)     There is no height or weight on file to calculate BMI.    EXAM:  GENERAL: This is a well-developed 65 year old adult who appears Mauro Tamez's stated age  HEAD: normocephalic  HEENT: Pupils equal and reactive bilaterally  CARDIAC: RRR without murmur  CHEST/LUNG:  Clear to auscultation  ABDOMEN: Soft, nontender, nondistended, no masses    NEUROLOGIC: Focally intact, nonfocal  VASCULAR: Pulses intact, symmetrical upper and lower extremities.  Bilateral varicose  veins        LABS:  Lab Results   Component Value Date    WBC 5.0 02/18/2021    HGB 14.0 02/18/2021    HCT 40.7 02/18/2021    MCV 98 02/18/2021     02/18/2021       No results found for: HGBA1C  Lab Results   Component Value Date    ALT 24 02/18/2021    AST 24 02/18/2021    ALKPHOS 51 02/18/2021        Images: reviewed old ultrasounds and studies.     Assessment/Plan:   Significant varicose veins bilaterally    Patient of note for many many years we have closed all the vessels were taken close from a that standpoint is now 4 months out from that and I think good results usually he has decrease in size of veins around the area of the vessel to close which he still does he still has varicosities of both lower legs no open ulcers or sores no chronic hemosiderin changes he wears compression daily we discussed we can do stab phlebectomy each have significant blood loss to be laid up for a while time.  And he at this time point is to get a hold off on that but happy to do so if he needs to have this done in the future with more problems or issues.  He is comfortable with his present plan and will see me back in the future if he has issues.    Return if symptoms worsen or fail to improve.     Pavel Stanley MD ,MD  [facility] Department of Surgery

## 2021-07-22 ENCOUNTER — TELEPHONE (OUTPATIENT)
Dept: VASCULAR SURGERY | Facility: CLINIC | Age: 66
End: 2021-07-22

## 2021-08-20 DIAGNOSIS — F11.21 OPIOID DEPENDENCE IN REMISSION (H): ICD-10-CM

## 2021-08-20 NOTE — TELEPHONE ENCOUNTER
Last seen: 6/8  RTC: 3 months   Cancel: none   No-show: none   Next appt: 10/5    Incoming refill from patient via phone     Medication requested: buprenorphine (SUBUTEX) 2 MG SUBL sublingual tablet  Directions: Place 10 tablets (20 mg) under the tongue daily - Sublingual  Qty: 300  Last refilled: 6/9 #300, 7/9 #300, 8/7 #300    Will route to provider for approval to refill with 9/3

## 2021-08-20 NOTE — TELEPHONE ENCOUNTER
[facility] Call Center    Phone Message    May a detailed message be left on voicemail: yes     Reason for Call: Medication Refill Request    Has the patient contacted the pharmacy for the refill? Yes   Name of medication being requested: subutex  Provider who prescribed the medication: Austen  Pharmacy: [facility] #[id number] [place], [place] - [address]    Date medication is needed: pt will need refill by 9/8, wasn't able to get appointment chrissy karimi again until October      Action Taken: Message routed to:  Other: nursing pool    Travel Screening: Not Applicable

## 2021-08-24 RX ORDER — BUPRENORPHINE 2 MG/1
20 TABLET SUBLINGUAL DAILY
Qty: 300 TABLET | Refills: 0 | Status: SHIPPED | OUTPATIENT
Start: 2021-09-03 | End: 2021-10-05

## 2021-09-12 ENCOUNTER — HEALTH MAINTENANCE LETTER (OUTPATIENT)
Age: 66
End: 2021-09-12

## 2021-09-14 ENCOUNTER — TELEPHONE (OUTPATIENT)
Dept: VASCULAR SURGERY | Facility: CLINIC | Age: 66
End: 2021-09-14

## 2021-09-14 NOTE — TELEPHONE ENCOUNTER
"Patient's wife calling. She states that patient was mowing the lawn on Friday and \"over did it\". Patient woke up at 1AM on Saturday screaming and complaining of cramps, he also states there was a lump on the leg. Today, wife states that the back of patient's leg is all purple and they are wondering what to do. Wife states that he has a history of excessive bleeding and veins bursting. Wife will email photos to [email or web address].  "

## 2021-09-15 NOTE — TELEPHONE ENCOUNTER
Patients wife is calling wondering why we suggested for him to see his PCP? She states Mauro has had severl surgeries with KW and states this vein issue.  Please call patient back with further instruction

## 2021-09-15 NOTE — TELEPHONE ENCOUNTER
Reviewed chart and photos with Dr Stanley. Nothing to do with veins. Looks like trauma/bruising. Spoke with patient and wife asked if any trauma recently. Patient reports jumping out of bed with sever leg cramps and hyperextended.Advised to start with PCP. Concerned of hematoma left inner thigh. Explained to call back if any other concerns.

## 2021-10-05 ENCOUNTER — OFFICE VISIT (OUTPATIENT)
Dept: PSYCHIATRY | Facility: CLINIC | Age: 66
End: 2021-10-05
Attending: PSYCHIATRY & NEUROLOGY
Payer: MEDICARE

## 2021-10-05 ENCOUNTER — LAB (OUTPATIENT)
Dept: LAB | Facility: CLINIC | Age: 66
End: 2021-10-05
Attending: PSYCHIATRY & NEUROLOGY
Payer: MEDICARE

## 2021-10-05 VITALS
BODY MASS INDEX: 29.32 KG/M2 | SYSTOLIC BLOOD PRESSURE: 121 MMHG | DIASTOLIC BLOOD PRESSURE: 75 MMHG | WEIGHT: 210.2 LBS | HEART RATE: 72 BPM

## 2021-10-05 DIAGNOSIS — F11.21 OPIOID DEPENDENCE IN REMISSION (H): ICD-10-CM

## 2021-10-05 DIAGNOSIS — Z79.899 ENCOUNTER FOR LONG-TERM (CURRENT) USE OF MEDICATIONS: Primary | ICD-10-CM

## 2021-10-05 DIAGNOSIS — Z79.899 ENCOUNTER FOR LONG-TERM (CURRENT) USE OF MEDICATIONS: ICD-10-CM

## 2021-10-05 DIAGNOSIS — F11.20 OPIOID DEPENDENCE, UNCOMPLICATED (H): ICD-10-CM

## 2021-10-05 PROCEDURE — 80299 QUANTITATIVE ASSAY DRUG: CPT

## 2021-10-05 PROCEDURE — 90833 PSYTX W PT W E/M 30 MIN: CPT | Performed by: PSYCHIATRY & NEUROLOGY

## 2021-10-05 PROCEDURE — 99214 OFFICE O/P EST MOD 30 MIN: CPT | Performed by: PSYCHIATRY & NEUROLOGY

## 2021-10-05 PROCEDURE — 80307 DRUG TEST PRSMV CHEM ANLYZR: CPT

## 2021-10-05 PROCEDURE — G0463 HOSPITAL OUTPT CLINIC VISIT: HCPCS

## 2021-10-05 RX ORDER — BUPRENORPHINE 2 MG/1
20 TABLET SUBLINGUAL DAILY
Qty: 300 TABLET | Refills: 2 | Status: SHIPPED | OUTPATIENT
Start: 2021-10-05 | End: 2022-01-03

## 2021-10-05 ASSESSMENT — PAIN SCALES - GENERAL: PAINLEVEL: NO PAIN (0)

## 2021-10-05 NOTE — PROGRESS NOTES
"  Progress Notes   Khloe Boyce MD   Psychiatry       PSYCHIATRY VISIT    The patient was seen for evaluation and management of opioid use disorder.  He is seen in person.   The patient was last seen 3 mo ago.    INTERIM HX: At last visit:  No change in dose of subutex . RTC 3 months,    Drug screen not done., due to remote visit    Patient reports that he has had some health problems. He has been getting leg cramps and had major pain for a few weeks with severe spasms. He reports breaking all the blood vessels in his leg.  It is now better.    He is worried about his wife; she is going to have bariatric surgery.     They have a hard time with her daughter refusing to talk to him or his wife.    He is attending meetings, mostly virtual.  He is taking care of himself.  17 yrs sober. No thoughts about use, He attends Sunday robi speaker mtgs   Patient has immersed himself in recovery.  He has 30 yrs of sobrietyfrom alcohol and 17 from opioids. He has a regular routine and at every visit shares wisdom from the program.  Last use of drugs was 1988, He does \"the next right thing.\" This is his philosophy and has been a source of peace and serenity.       Sleep is ok, energy -ok.  Mood is fine.      SH: He  misses his grandkids; his daughter is not allowing it.  He tries not to be angry at her and it has been 2 years since she saw grandkids.    Previously he reported that  his daughter had a drinking problem.  She has 2 kids but he has no relationsnhip with them because of his daughter who has \"thrown him under the bus.\".He knows his grandson is having behavior problems but his daughter is unwilling to talk to him.        The patient takes the same meds . He had Valium when he had vein removal.  It was very painful and did not receive any pain meds.       He has a pinched nerve in his back but will not be doing surgery.' It comes and goes and today it is ok.  He may need knee surgery but he wants injections instead.  " I believe he has intermittent rxs for short acting opioid when pain becomes worse.  I have not had any concerns about this though he is aware that it likely will not be very helpful because of being on sub.    He no longer has Ativan and is not interested in any rx.    Pain is manageable with the current dose of subutex.  Worst SE is sexual.  Also feels that he gets forgetful. He has trouble concentrating. He sleeps better without a benzo.   He has a home health aid from [employer], an agency through [facility].     Medical HX: Pain, torn meniscus in his hip. If he steps wrong, he gets sharp pain  .   Current Outpatient Medications:      buprenorphine (SUBUTEX) 2 MG SUBL sublingual tablet, Place 10 tablets (20 mg) under the tongue daily, Disp: 300 tablet, Rfl: 0     emtricitabine-tenofovir (TRUVADA) 200-300 MG per tablet, Take 1 tablet by mouth daily., Disp: , Rfl:      Magnesium Oxide 500 MG CAPS, , Disp: , Rfl:      naproxen (NAPROSYN) 500 MG tablet, Take 500 mg by mouth 2 times daily (with meals). prn, Disp: , Rfl:      Raltegravir Potassium (ISENTRESS PO), Take  by mouth., Disp: , Rfl:      He takes the Subutex spread out during the day.   SE: sexual dysfunction,  He is comfortable on this dose physically and does not think it possible after 12 yrs of being on the med, to go off even with a slow taper. It has not worked in the past; he becomes too uncomfortable.     ROS: Pain in his leg , remainder of comprehensive neg except as above      MENTAL STATUS EXAM:  Oriented. Mood is good, affect consistent.  . Thought processes are logical and goal directed. Thought content is normal. Associations intact. Speech RRR, language fluent, concentration good, fund of knowledge good, memory intact, Insight and judgment are good. Gait n/a, cognition appears intact but not formally tested.       ASSESSMENT:   Opioid dependence, in full remission, on agonist;   AIDS, stable for many years  Chronic pain, s/p rotator  cuff surgery.,   s/p dental extractions.    Venous insufficiency,   groin pain, back pain , knee pain,   S/P DVT,   possible ulcer.      PLAN:    No change in dose of subutex . RTC 3 months,    Drug screen  Done.,negative except for bup, as expected      Psychiatry Clinic Individual Psychotherapy Note                                                                     [16]   Start time - 300pm        End time - 316pm  Date last reviewed -2-24-20      Date next due -5-24-20    Subjective: This supportive psychotherapy session addressed issues related to current stressors consisting of financial, family.  Patient's reaction: Action in the context of mental status appropriate for ambulatory setting.  Progress: good  Plan: RTC 3mo  Psychotherapy services during this visit included  myself and the patient.   Treatment Plan      SYMPTOMS; PROBLEMS   MEASURABLE GOALS;    FUNCTIONAL IMPROVEMENT INTERVENTIONS;   GAINS MADE DISCHARGE CRITERIA   Substance Use: opioids   maintaining stable recovery, abstinence, recovery activities building on strengths  communication skills  community/ family support  increase coping skills  medications   self-care skills sustained recovery and medications not needed   Psychosocial: access to healthcare, financial hardship and relationship stress     Physical health: pain   locate resources, information to aid in decision making about health care     Improved pain acceptance of limitations/reality  communication skills  self-care skills, AA program    Appointment with surgeon symptom resolution

## 2021-10-09 LAB
BUPRENORPHINE UR-MCNC: 18 NG/ML
BUPRENORPHINE UR-MCNC: 378 NG/ML
NALOXONE UR CFM-MCNC: <100 NG/ML
NORBUPRENORPHINE UR CFM-MCNC: 370 NG/ML
NORBUPRENORPHINE UR-MCNC: 105 NG/ML

## 2022-01-03 DIAGNOSIS — F11.21 OPIOID DEPENDENCE IN REMISSION (H): ICD-10-CM

## 2022-01-03 RX ORDER — BUPRENORPHINE 2 MG/1
20 TABLET SUBLINGUAL DAILY
Qty: 300 TABLET | Refills: 2 | Status: SHIPPED | OUTPATIENT
Start: 2022-01-03 | End: 2022-01-18

## 2022-01-03 NOTE — TELEPHONE ENCOUNTER
[facility] Call Center    Phone Message    May a detailed message be left on voicemail: yes     Reason for Call: Medication Refill Request    Has the patient contacted the pharmacy for the refill? Yes   Name of medication being requested: suboxone  Provider who prescribed the medication:   Pharmacy: [facility] on file  Date medication is needed: Pt will be out of Wednesday.        Action Taken: Other: [facility] psych pool    Travel Screening: Not Applicable

## 2022-01-04 ENCOUNTER — TELEPHONE (OUTPATIENT)
Dept: PSYCHIATRY | Facility: CLINIC | Age: 67
End: 2022-01-04
Payer: MEDICARE

## 2022-01-04 NOTE — TELEPHONE ENCOUNTER
Prior Authorization Retail Medication Request    Medication/Dose: buprenorphine (SUBUTEX) 2 MG SUBL sublingual tablet  ICD code (if different than what is on RX):  F11.21  Previously Tried and Failed: Renewal  Rationale:  There are no other medication used for Opoid dependence maintenance therapy. Patient has been on this medication since at least 2016. Patient is stable on this medication and has been able to abstain from alcohol and drug use. Patient will experience withdrawal without this medication and will put him at risk for relapse. We are requesting an expedited review in the matter to avoid any further interruption in Mauro being able to receive this medication. Please feel free to contact our clinic with any further questions.      Insurance Name:  MEDICARE   Insurance ID:  [id number]      Pharmacy Information (if different than what is on RX)  Name:  [facility] #[id number]  Phone:  [phone]

## 2022-01-04 NOTE — TELEPHONE ENCOUNTER
Writer called the patient and notified him of the approval. Informed him the pharmacy is currently working on filling it.

## 2022-01-04 NOTE — TELEPHONE ENCOUNTER
Central Prior Authorization Team   Phone: [phone]      PA Initiation    Medication: buprenorphine (SUBUTEX) 2 MG SUBL sublingual tablet  Insurance Company: [employer] - Phone [phone] Fax [phone]  Pharmacy Filling the Rx: [facility] #[id number] - [place], [place] - [address] AT [facility] [address]  Filling Pharmacy Phone: [phone]  Filling Pharmacy Fax:    Start Date: 1/4/2022

## 2022-01-04 NOTE — TELEPHONE ENCOUNTER
Prior Authorization Approval    Authorization Effective Date: 1/1/2022  Authorization Expiration Date: 12/31/2022  Medication: buprenorphine (SUBUTEX) 2 MG SUBL sublingual tablet-APPROVED  Approved Dose/Quantity:   Reference #:     Insurance Company: [employer]ali [employer] - Phone [phone] Fax [phone]  Expected CoPay:       CoPay Card Available:      Foundation Assistance Needed:    Which Pharmacy is filling the prescription (Not needed for infusion/clinic administered): [facility] #[id number] - [place], [place] - [facility] [address] AT [facility]  Pharmacy Notified: Yes-Pharmacy will notify patient when ready.  Patient Notified: No

## 2022-01-04 NOTE — TELEPHONE ENCOUNTER
----- Message -----   From: Rosamaria Martinez   Sent: 1/4/2022  12:55 PM CST   To: [facility] Psychiatry [facility]   Subject: Austen pt - PA needed, pt req call back         Hi Team,     Pt called and said he contacted his pharmacy about his refills and they told him they needed a PA. Pt asked that someone call him back once the PA and his refills go through.     Phone: [phone] - okay to [facility]       Thank you,   Rosamaria

## 2022-01-18 ENCOUNTER — TELEPHONE (OUTPATIENT)
Dept: PSYCHIATRY | Facility: CLINIC | Age: 67
End: 2022-01-18
Payer: MEDICARE

## 2022-01-18 ENCOUNTER — VIRTUAL VISIT (OUTPATIENT)
Dept: PSYCHIATRY | Facility: CLINIC | Age: 67
End: 2022-01-18
Attending: PSYCHIATRY & NEUROLOGY
Payer: MEDICARE

## 2022-01-18 DIAGNOSIS — F11.21 OPIOID DEPENDENCE IN REMISSION (H): ICD-10-CM

## 2022-01-18 PROCEDURE — 99443 PR PHYSICIAN TELEPHONE EVALUATION 21-30 MIN: CPT | Mod: 95 | Performed by: PSYCHIATRY & NEUROLOGY

## 2022-01-18 RX ORDER — BUPRENORPHINE 2 MG/1
20 TABLET SUBLINGUAL DAILY
Qty: 300 TABLET | Refills: 0 | Status: SHIPPED | OUTPATIENT
Start: 2022-01-18 | End: 2022-04-05

## 2022-01-18 NOTE — TELEPHONE ENCOUNTER
Writer called the pt at both numbers listed, no answer to both.  Link was sent to [phone].  Merline Simpson CMA

## 2022-01-18 NOTE — TELEPHONE ENCOUNTER
On January 18, 2022, at 3:35 PM, writer called patient at mobile to confirm Virtual Visit. Writer unable to make contact with patient. Writer left detailed voice message for call back. [phone] left as call back number. THEODORE Porter    On January 18, 2022, at 4:03 PM, writer called patient at mobile to confirm Virtual Visit. Writer unable to make contact with patient. A link to the video visit was sent to the patient's email address and mobile phone number. Blake Hernandez, EMT

## 2022-01-18 NOTE — PROGRESS NOTES
TELEPHONE VISIT  Mauro Tamez is a 66 year old pt. who is being evaluated via a billable telephone visit.      The patient has been notified of the following:    We have found that certain health care needs can be provided without the need for a physical exam. This service lets us provide the care you need with a short phone conversation. If a prescription is necessary we can send it directly to your pharmacy. If lab work is needed we can place an order for that and you can then stop by our lab to have the test done at a later time. Insurers are generally covering virtual visits as they would in-office visits so billing should not be different than normal.  If for some reason you do get billed incorrectly, you should contact the billing office to correct it and that number is in the AVS .    Patient has given verbal consent for a telephone visit?:  Yes   How would the pt like to obtain the AVS?:  Service at Home[facility] SmartPhrase [PsychAVS] has been placed in 'Patient Instructions':  No     Start Time: 440pm     End Time: 505 pm    Progress Notes   Khloe Boyce MD   Psychiatry       PSYCHIATRY VISIT    The patient was seen for evaluation and management of opioid use disorder.  He is seen in person.   The patient was last seen 3 mo ago.    INTERIM HX: At last visit:  No change in dose of subutex . RTC 3 months,    Drug screen not done., due to remote visit    Patient reports that today he is not feeling well but otherwise he is doing well - he is going to his Tuesday meeting.  He stopped in person mtgs- going 2x/week via zoom.   He is doing the same thing every day;  He is not going anywhere because of covid.  There is not any stimulation .  He has a person coming in weekly to clean and help out.  Patient does not interact with anyone.  His good friend Haylie became very depressed and is hospitalized.  She is receiving ECT and not her usual self anymore.  This occurred in the year after custodial.      Mood is ok,  "it is just hard.       His health has been doing well; he has some back issues on and off.  He was evaluated for abdominal pain; he had hep C and had treatment and virus cleared.  His liver is fine.      His wife is going to have bariatric surgery but it is on hold due to covid.     They have a hard time with her daughter refusing to talk to him or his wife.    He is attending meetings, virtual.  He is taking care of himself.  17 yrs sober. No thoughts about use, He attends Sunday robi speaker mtgs   Patient has immersed himself in recovery.  He has 30 yrs of sobrietyfrom alcohol and 17 from opioids. He has a regular routine and at every visit shares wisdom from the program.  Last use of drugs was 1988, He does \"the next right thing.\" This is his philosophy and has been a source of peace and serenity.       Sleep is ok, energy -ok.  Mood is fine.      SH: He  misses his grandkiEVS Glaucoma Therapeutics; his daughter is not allowing it.  He tries not to be angry at her and it has been 2 years since she saw grandkids.    Previously he reported that  his daughter had a drinking problem.  She has 2 kids but he has no relationsnhip with them because of his daughter who has \"thrown him under the bus.\".He knows his grandson is having behavior problems but his daughter is unwilling to talk to him.     Hx: he has had a pinched nerve in his back but will not be doing surgery.' It comes and goes and today it is ok.  He may need knee surgery but he wants injections instead.  I believe he has intermittent rxs for short acting opioid when pain becomes worse.  I have not had any concerns about this though he is aware that it likely will not be very helpful because of being on sub.    He no longer has Ativan and is not interested in any rx.    Pain is manageable with the current dose of subutex.  Worst SE is sexual.  Also feels that he gets forgetful. He has trouble concentrating. He sleeps better without a benzo.   He has a home health aid from [place], " an agency through [facility].     Medical HX: Pain, torn meniscus in his hip. If he steps wrong, he gets sharp pain  .   Current Outpatient Medications:      buprenorphine (SUBUTEX) 2 MG SUBL sublingual tablet, Place 10 tablets (20 mg) under the tongue daily, Disp: 300 tablet, Rfl: 2     emtricitabine-tenofovir (TRUVADA) 200-300 MG per tablet, Take 1 tablet by mouth daily., Disp: , Rfl:      Magnesium Oxide 500 MG CAPS, , Disp: , Rfl:      naproxen (NAPROSYN) 500 MG tablet, Take 500 mg by mouth 2 times daily (with meals). prn, Disp: , Rfl:      Raltegravir Potassium (ISENTRESS PO), Take  by mouth., Disp: , Rfl:      He takes the Subutex spread out during the day.   SE: sexual dysfunction,  He is comfortable on this dose physically and does not think it possible after 12 yrs of being on the med, to go off even with a slow taper. It has not worked in the past; he becomes too uncomfortable.     ROS: Pain in his leg , remainder of comprehensive neg except as above      MENTAL STATUS EXAM:  Oriented. Mood is good, affect consistent.  . Thought processes are logical and goal directed. Thought content is normal. Associations intact. Speech RRR, language fluent, concentration good, fund of knowledge good, memory intact, Insight and judgment are good. Gait n/a, cognition appears intact but not formally tested.       ASSESSMENT:   Opioid dependence, in full remission, on agonist;   AIDS, stable for many years  Chronic pain, s/p rotator cuff surgery.,   s/p dental extractions.    Venous insufficiency,   groin pain, back pain , knee pain,   S/P DVT,      PLAN:    No change in dose of subutex . RTC 3 months,        Psychiatry Clinic Individual Psychotherapy Note                                                                     [16]   Start time - 440pm        End time - 456pm  Date last reviewed -2-24-20      Date next due -5-24-20    Subjective: This supportive psychotherapy session addressed issues related to  current stressors consisting of financial, family.  Patient's reaction: Action in the context of mental status appropriate for ambulatory setting.  Progress: good  Plan: RTC 3mo  Psychotherapy services during this visit included  myself and the patient.   Treatment Plan      SYMPTOMS; PROBLEMS   MEASURABLE GOALS;    FUNCTIONAL IMPROVEMENT INTERVENTIONS;   GAINS MADE DISCHARGE CRITERIA   Substance Use: opioids   maintaining stable recovery, abstinence, recovery activities building on strengths  communication skills  community/ family support  increase coping skills  medications   self-care skills sustained recovery and medications not needed   Psychosocial: access to healthcare, financial hardship and relationship stress     Physical health: pain   locate resources, information to aid in decision making about health care     Improved pain acceptance of limitations/reality  communication skills  self-care skills, AA program    Appointment with surgeon symptom resolution

## 2022-04-05 ENCOUNTER — VIRTUAL VISIT (OUTPATIENT)
Dept: PSYCHIATRY | Facility: CLINIC | Age: 67
End: 2022-04-05
Attending: PSYCHIATRY & NEUROLOGY
Payer: MEDICARE

## 2022-04-05 DIAGNOSIS — F11.21 OPIOID DEPENDENCE IN REMISSION (H): ICD-10-CM

## 2022-04-05 PROCEDURE — 99443 PR PHYSICIAN TELEPHONE EVALUATION 21-30 MIN: CPT | Mod: 95 | Performed by: PSYCHIATRY & NEUROLOGY

## 2022-04-05 PROCEDURE — 99207 PR NO CHARGE LOS: CPT | Mod: 95 | Performed by: PSYCHIATRY & NEUROLOGY

## 2022-04-05 RX ORDER — BUPRENORPHINE 2 MG/1
20 TABLET SUBLINGUAL DAILY
Qty: 300 TABLET | Refills: 2 | Status: SHIPPED | OUTPATIENT
Start: 2022-04-05 | End: 2022-07-05

## 2022-04-05 NOTE — PROGRESS NOTES
Mauro Tamez is a 66 year old who has consented to receive services via billable video visit.      Pt will join video visit via: Sofea  If there are problems joining the visit, send backup video invite via: QuickMobile      Originating Location (patient location): Patient's home  Distant Location (provider location): [facility] MENTAL HEALTH & ADDICTION [place] [facility]    Will anyone else be joining the video visit? No    How would you prefer to obtain AVS?: TaskRabbit     TELEPHONE VISIT  Mauro Tamez is a 66 year old pt. who is being evaluated via a billable telephone visit.      The patient has been notified of the following:    We have found that certain health care needs can be provided without the need for a physical exam. This service lets us provide the care you need with a short phone conversation. If a prescription is necessary we can send it directly to your pharmacy. If lab work is needed we can place an order for that and you can then stop by our lab to have the test done at a later time. Insurers are generally covering virtual visits as they would in-office visits so billing should not be different than normal.  If for some reason you do get billed incorrectly, you should contact the billing office to correct it and that number is in the AVS .    Patient has given verbal consent for a telephone visit?:  Yes   How would the pt like to obtain the AVS?:  TaskRabbit  AVS SmartPhrase [PsychAVS] has been placed in 'Patient Instructions':  No     Start Time: 230pm     End Time: 300 pm    Progress Notes   Khloe Boyce MD   Psychiatry       PSYCHIATRY VISIT    The patient was seen for evaluation and management of opioid use disorder.  He is seen in person.   The patient was last seen 3 mo ago.    INTERIM HX: At last visit:  No change in dose of subutex . RTC 3 months,    Drug screen not done., due to remote visit    Patient reports a friend from AA  last night ; he is very sad about  it.  He is doing ok;  He is affected by the weather.    Of note, he had a conversation with his grandkids a few weeks ago.  The kids were on the phone from school and calling .  Their daughter was in the hospital psychiatrically. .  Her boyfriend now has the children but they are terrified of them.  They got the police got involved who advised him to get order of protection for the kids. They  filled out paper of protection. Custody was granted ! However, they went to the [facility] but she was now home when they went to get the kids.  The patient is now talking to the [facility] to get grandparents' rights.  It is really disappointing.  They love the kids a lot and the kids were really excited to see them and hugged them.  When the boyfriend saw the patient with the kids he demanded they leave, angrily.  The kids were shaking when they had to leave them and go with the boyfriend.     The gift is that he has his sobriety and is using the program to live his life.      He goes to zoom AA meetings because of his health risk.  He is considering resuming a zoom in person at the [facility] attended [facility].    Mood is ok, it is just hard.  Interests, motivation, sleep are all ok.  Denies depressive sxs.      His health has been doing well; he has some back issues on and off. he had hep C and had treatment and virus cleared.  His liver is fine.  He has hip pain all the time and other health problems such as knee pain.  Weather has effected his health.      Per ,  He has been prescribed oxycodone and valium , he cannot take ibuprofen and Tylenol.  He uses them sparingly.   Last rx for oxycodone 5 mg #60 on 3/2 and valium for back spasms 5 mg #15 also on 3/2.   Pain med is for the musculoskeletal pain . He has used them responsibly and we have discussed his use on top of bup.     He is grateful every day for his sobriety.   He is taking care of himself.  17 yrs sober. No thoughts about use, He attends Sunday robi speaker  "mtgs   Patient has immersed himself in recovery.  He has 30 yrs of sobrietyfrom alcohol and 17 from opioids. He has a regular routine and at every visit shares wisdom from the program.  Last use of drugs was 1988, He does \"the next right thing.\" This is his philosophy and has been a source of peace and serenity.       SH: He  misses his grandkids; his daughter is not allowing it.  Previously he reported that  his daughter had a drinking problem.  He does not know the reason for her psych hospitalization. She has 2 kids but he has had no relationsnhip with them because of his daughter who has \"thrown him under the bus.\".He knows his grandson is having behavior problems but his daughter is unwilling to talk to him.     Hx: he has had a pinched nerve in his back but will not be doing surgery.' It comes and goes and today it is ok.  He may need knee surgery but he wants injections instead.  I believe he has intermittent rxs for short acting opioid when pain becomes worse.  I have not had any concerns about this though he is aware that it likely will not be very helpful because of being on sub.    Pain is manageable with the current dose of subutex.  Worst SE is sexual.  Also feels that he gets forgetful. He has trouble concentrating. He sleeps better without a benzo.   He has a home health aid from [facility], an agency through [employer].     Medical HX: Pain, torn meniscus in his hip. If he steps wrong, he gets sharp pain  .   Current Outpatient Medications:      buprenorphine (SUBUTEX) 2 MG SUBL sublingual tablet, Place 10 tablets (20 mg) under the tongue daily Do not fill until March 30,2022, Disp: 300 tablet, Rfl: 0     emtricitabine-tenofovir (TRUVADA) 200-300 MG per tablet, Take 1 tablet by mouth daily., Disp: , Rfl:      Magnesium Oxide 500 MG CAPS, , Disp: , Rfl:      naproxen (NAPROSYN) 500 MG tablet, Take 500 mg by mouth 2 times daily (with meals). prn, Disp: , Rfl:      Raltegravir Potassium " (ISENTRESS PO), Take  by mouth., Disp: , Rfl:      He takes the Subutex spread out during the day.   SE: sexual dysfunction,  He is comfortable on this dose physically and does not think it possible after 12 yrs of being on the med, to go off even with a slow taper. It has not worked in the past; he becomes too uncomfortable.     ROS: Pain in his leg , remainder of comprehensive neg except as above      MENTAL STATUS EXAM:  Oriented. Mood is good, affect consistent.  . Thought processes are logical and goal directed. Thought content is normal. Associations intact. Speech RRR, language fluent, concentration good, fund of knowledge good, memory intact, Insight and judgment are good. Gait n/a, cognition appears intact but not formally tested.       ASSESSMENT:   Opioid dependence, in full remission, on agonist;   AIDS, stable for many years  Chronic pain, s/p rotator cuff surgery.,   s/p dental extractions.  No concerning use of opiate or valium rxs  Venous insufficiency,   groin pain, back pain , knee pain,   S/P DVT,      PLAN:    No change in dose of subutex . RTC 3 months,        Psychiatry Clinic Individual Psychotherapy Note                                                                     [16]   Start time - 230pm        End time - 246pm  Date last reviewed -2-24-20      Date next due -5-24-20    Subjective: This supportive psychotherapy session addressed issues related to current stressors consisting of financial, family.  Patient's reaction: Action in the context of mental status appropriate for ambulatory setting.  Progress: good  Plan: RTC 3mo  Psychotherapy services during this visit included  myself and the patient.   Treatment Plan      SYMPTOMS; PROBLEMS   MEASURABLE GOALS;    FUNCTIONAL IMPROVEMENT INTERVENTIONS;   GAINS MADE DISCHARGE CRITERIA   Substance Use: opioids   maintaining stable recovery, abstinence, recovery activities building on strengths  communication skills  community/ family  support  increase coping skills  medications   self-care skills sustained recovery and medications not needed   Psychosocial: access to healthcare, financial hardship and relationship stress     Physical health: pain   locate resources, information to aid in decision making about health care     Improved pain acceptance of limitations/reality  communication skills  self-care skills, AA program    Appointment with surgeon symptom resolution

## 2022-04-05 NOTE — NURSING NOTE
Pt states theyre in emotional pain, pts close friend  recently.     Pt also requested refills on medication.

## 2022-04-05 NOTE — PATIENT INSTRUCTIONS
**For crisis resources, please see the information at the end of this document**   Patient Education    Thank you for coming to the [facility] MENTAL HEALTH & ADDICTION [place] [facility].    Lab Testing:  If you had lab testing today and your results are reassuring or normal they will be mailed to you or sent through West[employer] within 7 days. If the lab tests need quick action we will call you with the results. The phone number we will call with results is # [phone]. If this is not the best number please call our clinic and change the number.     Medication Refills:  If you need any refills please call your pharmacy and they will contact us. Our fax number for refills is [phone]. Please allow three business days for refill processing.   If you need to change to a different pharmacy, please contact the new pharmacy directly. The new pharmacy will help you get your medications transferred.     Contact Us:  Please call [phone] during business hours (8-5:00 M-F).  If you have medication related questions after clinic hours, or on the weekend, please call [phone].    Financial Assistance [phone]  Medical Records [phone]       MENTAL HEALTH CRISIS RESOURCES:  For a emergency help, please call 911 or go to the nearest Emergency Department.     Emergency Walk-In Options:   EmPATH Unit @ [facility] ([place]): [phone] - Specialized mental health emergency area designed to be calming  [facility] ([place]): [phone]  [facility] ([place]): [phone]  [facility]): [phone]    County Crisis Information:   [place]: [phone]  [place]: [phone]  Rob (KELLIE) - Adult: [phone]     Child: [phone]  [place] - Adult: [phone]     Child: [phone]  [place]: [phone]  List of all [facility] resources:    https://[place].gov/dhs/people-we-serve/adults/health-care/mental-health/resources/crisis-contacts.jsp    National Crisis Information:   Crisis Text Line: Text  [place]  to 293600  National Suicide Prevention Lifeline: 8-321-717-TALK (1-913.180.1827)       For online chat options, visit https://suicidepreventionlifeline.org/chat/  Poison Control Center: 4-811-905-5820  Trans Lifeline: 0-557-461-1323 - Hotline for transgender people of all ages  The Madhav Project: 0-559-788-1131 - Hotline for LGBT youth     For Non-Emergency Support:   Fast Tracker: Mental Health & Substance Use Disorder Resources -   https://www.CDELn.org/

## 2022-04-24 ENCOUNTER — HEALTH MAINTENANCE LETTER (OUTPATIENT)
Age: 67
End: 2022-04-24

## 2022-07-05 ENCOUNTER — VIRTUAL VISIT (OUTPATIENT)
Dept: PSYCHIATRY | Facility: CLINIC | Age: 67
End: 2022-07-05
Attending: PSYCHIATRY & NEUROLOGY
Payer: MEDICARE

## 2022-07-05 DIAGNOSIS — F11.21 OPIOID DEPENDENCE IN REMISSION (H): ICD-10-CM

## 2022-07-05 PROCEDURE — 90833 PSYTX W PT W E/M 30 MIN: CPT | Mod: 95 | Performed by: PSYCHIATRY & NEUROLOGY

## 2022-07-05 PROCEDURE — 99214 OFFICE O/P EST MOD 30 MIN: CPT | Mod: 95 | Performed by: PSYCHIATRY & NEUROLOGY

## 2022-07-05 RX ORDER — BUPRENORPHINE 2 MG/1
20 TABLET SUBLINGUAL DAILY
Qty: 300 TABLET | Refills: 2 | Status: SHIPPED | OUTPATIENT
Start: 2022-07-05 | End: 2022-09-13

## 2022-07-05 NOTE — PROGRESS NOTES
"Mauro Tamez is a 66 year old who has consented to receive services via billable video visit.      Pt will join video visit via: CFEngine  If there are problems joining the visit, send backup video invite via: Intelligence Architects      Originating Location (patient location): Patient's home  Distant Location (provider location): [facility] MENTAL HEALTH & ADDICTION [place] [facility]    Will anyone else be joining the video visit? No    How would you prefer to obtain AVS?: Xanodyne     Telehealth Details  Type of service:  video visit for medication management  Time of service:    Start Time:  240pm    End Time:  300    Originating Site (patient location):  [facility]   Location- Patient's home  Distant Site (provider location):  [facility]  Mode of Communication:  Video conference via CFEngine    Progress Notes   Khloe Boyce MD   Psychiatry       PSYCHIATRY VISIT    The patient was seen for evaluation and management of opioid use disorder.  He is seen in person.   The patient was last seen 3 mo ago.    INTERIM HX: At last visit:  No change in dose of subutex . RTC 3 months,    Drug screen not done., due to remote visit    Last visit 3 mo ago.    Patient is doing pretty good, one day at a time.  Mood good, energy ok, good interests.    He continues to do zoom  AA due to health concerns until he gets a booster shot in the fall. He does not like driving at night.     He continues to be prescribed oxycodone #60 every 3 months, 5 mg #60 and valium 5 mg #15 every 3 months.  I do not have concerns about his use of it; he takes them appropriately.   He takes oxy 3x/week and it takes the edge off his pain . Pain is hip, groin, legs. Valium is only for muscle spasm and is very effective.   Sleep is always \"iffy,\"  He gets lots of leg pain, they ache all the time.  He cannot take ibuprofen and Tylenol.    He is worried about his wife and her at [employer] [employer] to [facility].    He has great community with persons in recovery.      He " "has tried to get his grandkids but he has anticipated obstacles from the person who gave a police report against his daughter and boyfriend.  He will not answer the phone.  Both of his daughters refuse contact for no reason.      Patient would like to sell the house but it is too expensive to find other housing.     The gift is that he has his sobriety and is using the program to live his life.      He goes to [employer] meetings also because of his health risk.      He is grateful every day for his sobriety.   He is taking care of himself.  17 yrs sober. No thoughts about use, He attends Sunday robi speaker mtgs   Patient has immersed himself in recovery.  He has 30 yrs of sobrietyfrom alcohol and 17 from opioids. He has a regular routine and at every visit shares wisdom from the program.  Last use of drugs was 1988, He does \"the next right thing.\" This is his philosophy and has been a source of peace and serenity.       SH: He  misses his grandkids; his daughter is not allowing it.  Previously he reported that  his daughter had a drinking problem.  He does not know the reason for her psych hospitalization. She has 2 kids but he has had no relationsnhip with them because of his daughter who has \"thrown him under the bus.\".He knows his grandson is having behavior problems but his daughter is unwilling to talk to him.     Hx: he has had a pinched nerve in his back but will not be doing surgery.' It comes and goes and today it is ok.  He may need knee surgery but he wants injections instead.  I believe he has intermittent rxs for short acting opioid when pain becomes worse.  I have not had any concerns about this though he is aware that it likely will not be very helpful because of being on sub.    Pain is manageable with the current dose of subutex.  Worst SE is sexual.  Also feels that he gets forgetful. He has trouble concentrating. He sleeps better without a benzo.   He has a home health aid from [employer], an agency " through [employer].     Medical HX: Pain, torn meniscus in his hip. If he steps wrong, he gets sharp pain  .   Current Outpatient Medications:      buprenorphine (SUBUTEX) 2 MG SUBL sublingual tablet, Place 10 tablets (20 mg) under the tongue daily, Disp: 300 tablet, Rfl: 2     emtricitabine-tenofovir (TRUVADA) 200-300 MG per tablet, Take 1 tablet by mouth daily., Disp: , Rfl:      Magnesium Oxide 500 MG CAPS, , Disp: , Rfl:      Raltegravir Potassium (ISENTRESS PO), Take  by mouth., Disp: , Rfl:      He takes the Subutex spread out during the day.   SE: sexual dysfunction,  He is comfortable on this dose physically and does not think it possible after 12 yrs of being on the med, to go off even with a slow taper. It has not worked in the past; he becomes too uncomfortable.     ROS: Pain in his leg , remainder of comprehensive neg except as above      MENTAL STATUS EXAM:  Oriented. Mood is good, affect consistent.  . Thought processes are logical and goal directed. Thought content is normal. Associations intact. Speech RRR, language fluent, concentration good, fund of knowledge good, memory intact, Insight and judgment are good. Gait n/a, cognition appears intact but not formally tested.       ASSESSMENT:   Opioid dependence, in full remission, on agonist;   AIDS, stable for many years  Chronic pain, s/p rotator cuff surgery.,   s/p dental extractions.  No concerning use of opiate or valium rxs  Venous insufficiency,   groin pain, back pain , knee pain,   S/P DVT,      PLAN:    No change in dose of subutex . RTC 3 months,        Psychiatry Clinic Individual Psychotherapy Note                                                                     [16]   Start time - 240pm        End time - 256pm  Date last reviewed -2-24-20      Date next due -5-24-20    Subjective: This supportive psychotherapy session addressed issues related to current stressors consisting of financial, family.  Patient's reaction: Action  in the context of mental status appropriate for ambulatory setting.  Progress: good  Plan: RTC 3mo  Psychotherapy services during this visit included  myself and the patient.   Treatment Plan      SYMPTOMS; PROBLEMS   MEASURABLE GOALS;    FUNCTIONAL IMPROVEMENT INTERVENTIONS;   GAINS MADE DISCHARGE CRITERIA   Substance Use: opioids   maintaining stable recovery, abstinence, recovery activities building on strengths  communication skills  community/ family support  increase coping skills  medications   self-care skills sustained recovery and medications not needed   Psychosocial: access to healthcare, financial hardship and relationship stress     Physical health: pain   locate resources, information to aid in decision making about health care     Improved pain acceptance of limitations/reality  communication skills  self-care skills, AA program    Appointment with surgeon symptom resolution

## 2022-07-05 NOTE — PATIENT INSTRUCTIONS
**For crisis resources, please see the information at the end of this document**   Patient Education    Thank you for coming to the [facility] MENTAL HEALTH & ADDICTION [place] [facility].     Lab Testing:  If you had lab testing today and your results are reassuring or normal they will be mailed to you or sent through gumi within 7 days. If the lab tests need quick action we will call you with the results. The phone number we will call with results is # [phone]. If this is not the best number please call our clinic and change the number.     Medication Refills:  If you need any refills please call your pharmacy and they will contact us. Our fax number for refills is [phone].   Three business days of notice are needed for general medication refill requests.   Five business days of notice are needed for controlled substance refill requests.   If you need to change to a different pharmacy, please contact the new pharmacy directly. The new pharmacy will help you get your medications transferred.     Contact Us:  Please call [phone] during business hours (8-5:00 M-F).   If you have medication related questions after clinic hours, or on the weekend, please call [phone].     Financial Assistance [phone]   Medical Records [phone]       MENTAL HEALTH CRISIS RESOURCES:  For a emergency help, please call 911 or go to the nearest Emergency Department.     Emergency Walk-In Options:   EmPATH Unit @ [facility] ([place]): [phone] - Specialized mental health emergency area designed to be calming  [facility] ([place]): [phone]  [facility] Acute Psychiat[facility] ([place]): [phone]  [facility]): [phone]    [facility] Crisis Information:   [place]: [phone]  [place]: [phone]  [place] (KELLIE) - Adult: [phone]     Child: [phone]  [place] - Adult: [phone]     Child: [phone]  [place]:  [phone]  List of all [facility] resources:   https://[place].gov/dhs/people-we-serve/adults/health-care/mental-health/resources/crisis-contacts.jsp    National Crisis Information:   Crisis Text Line: Text  [place]  to 668247  National Suicide Prevention Lifeline: 5-923-523-TALK (1-389.138.4056)       For online chat options, visit https://suicidepreventionlifeline.org/chat/  Poison Control Center: 1-113-636-7600  Trans Lifeline: 5-097-341-3562 - Hotline for transgender people of all ages  The Madhav Project: 3-321-115-2855 - Hotline for LGBT youth     For Non-Emergency Support:   Fast Tracker: Mental Health & Substance Use Disorder Resources -   https://www.Neuralan.org/

## 2022-08-30 ENCOUNTER — TELEPHONE (OUTPATIENT)
Dept: PSYCHIATRY | Facility: CLINIC | Age: 67
End: 2022-08-30

## 2022-08-30 DIAGNOSIS — F11.21 OPIOID DEPENDENCE IN REMISSION (H): ICD-10-CM

## 2022-08-30 NOTE — TELEPHONE ENCOUNTER
Writer called pharmacy who confirmed that patient has an available refill remaining of Subutex. Writer called patient and LVM notifying of availability.

## 2022-08-30 NOTE — TELEPHONE ENCOUNTER
[facility] Call Center    Phone Message    May a detailed message be left on voicemail: yes     Reason for Call: Medication Refill Request    Has the patient contacted the pharmacy for the refill? Yes     Name of medication being requested: Buprenorphine    Provider who prescribed the medication: Austen    Pharmacy: [id number] [place], [place] - [address]    Date medication is needed: Needed before the weekend - will run out on Sunday and pharmacy is closed on weekends         Action Taken: Other: nursing pool    Travel Screening: Not Applicable

## 2022-09-13 ENCOUNTER — VIRTUAL VISIT (OUTPATIENT)
Dept: PSYCHIATRY | Facility: CLINIC | Age: 67
End: 2022-09-13
Attending: PSYCHIATRY & NEUROLOGY
Payer: MEDICARE

## 2022-09-13 DIAGNOSIS — F11.21 OPIOID DEPENDENCE IN REMISSION (H): ICD-10-CM

## 2022-09-13 PROCEDURE — 99214 OFFICE O/P EST MOD 30 MIN: CPT | Mod: 95 | Performed by: PSYCHIATRY & NEUROLOGY

## 2022-09-13 PROCEDURE — 90833 PSYTX W PT W E/M 30 MIN: CPT | Mod: 95 | Performed by: PSYCHIATRY & NEUROLOGY

## 2022-09-13 RX ORDER — BUPRENORPHINE 2 MG/1
20 TABLET SUBLINGUAL DAILY
Qty: 300 TABLET | Refills: 2 | Status: SHIPPED | OUTPATIENT
Start: 2022-09-13 | End: 2022-12-06

## 2022-09-13 NOTE — PROGRESS NOTES
Mauro Tamez is a 66 year old who has consented to receive services via billable video visit.      Pt will join video visit via: [employer]  If there are problems joining the visit, send backup video invite via: Send to preferred e-mail: [email or web address]      Originating Location (patient location): Patient's home  Distant Location (provider location): [facility] MENTAL HEALTH & ADDICTION [place] [facility]    Will anyone else be joining the video visit? No

## 2022-09-13 NOTE — PATIENT INSTRUCTIONS
**For crisis resources, please see the information at the end of this document**   Patient Education    Thank you for coming to the [facility] [place] [facility].     Lab Testing:  If you had lab testing today and your results are reassuring or normal they will be mailed to you or sent through Q[employer] Xiaoâ€™er within 7 days. If the lab tests need quick action we will call you with the results. The phone number we will call with results is # [phone]. If this is not the best number please call our clinic and change the number.     Medication Refills:  If you need any refills please call your pharmacy and they will contact us. Our fax number for refills is [phone].   Three business days of notice are needed for general medication refill requests.   Five business days of notice are needed for controlled substance refill requests.   If you need to change to a different pharmacy, please contact the new pharmacy directly. The new pharmacy will help you get your medications transferred.     Contact Us:  Please call [phone] during business hours (8-5:00 M-F).   If you have medication related questions after clinic hours, or on the weekend, please call [phone].     Financial Assistance [phone]   Medical Records [phone]       MENTAL HEALTH CRISIS RESOURCES:  For a emergency help, please call 911 or go to the nearest Emergency Department.     Emergency Walk-In Options:   EmPATH Unit @ [facility] ([place]): [phone] - Specialized mental health emergency area designed to be calming  [facility] ([place]): [phone]  [facility] ([place]): [phone]  [facility]): [phone]    [facility] Crisis Information:   [place]: [phone]  [place]: [phone]  Rob (KELLIE) - Adult: [phone]     Child: [phone]  [place] - Adult: [phone]     Child: [phone]  [place]:  [phone]  List of all [facility] resources:   https://[place].gov/dhs/people-we-serve/adults/health-care/mental-health/resources/crisis-contacts.jsp    National Crisis Information:   Crisis Text Line: Text  [place]  to 845897  Suicide & Crisis Lifeline: 988  National Suicide Prevention Lifeline: 8-558-835-TALK (1-368.944.3473)       For online chat options, visit https://suicidepreventionlifeline.org/chat/  Poison Control Center: 0-430-939-0881  Trans Lifeline: 3-734-950-5045 - Hotline for transgender people of all ages  [employer]: 2-134-295-5183 - Hotline for LGBT youth     For Non-Emergency Support:   Fast Tracker: Mental Health & Substance Use Disorder Resources -   https://www.LilLuxeckSHADOWn.org/

## 2022-09-13 NOTE — PROGRESS NOTES
Mauro Tamez is a 66 year old who has consented to receive services via billable video visit.      Pt will join video visit via: [employer]  If there are problems joining the visit, send backup video invite via: Group[employer]      Originating Location (patient location): Patient's home  Distant Location (provider location): [facility] [place] [facility]    Will anyone else be joining the video visit? No    How would you prefer to obtain AVS?: Quot[employer] Technology     Telehealth Details  Type of service:  video visit for medication management  Time of service:    Start Time:  200pm    End Time:  225    Originating Site (patient location):  [facility]   Location- Patient's home  Distant Site (provider location):  [facility]  Mode of Communication:  Video conference via [employer]    Progress Notes   Khloe Boyce MD   Psychiatry       PSYCHIATRY VISIT    The patient was seen for evaluation and management of opioid use disorder.  He is seen in person.   The patient was last seen 2 mo ago.    INTERIM HX: At last visit:  No change in dose of subutex . RTC 3 months,    Drug screen not done., due to remote visit    He is having leg cramps and aching days.  He is taking Mg.  He went to see his physician last week and except for pain issues nothing will be changed.    He is worried about his wife because of at fib and heart  Failure.  He is hoping for ablation procedure. The meds have not been effective.     His mood can be low and varies day to day because of not having their grandkids.  The last contact has been March 2022.  The neighbor who was monitoring the situation has now left.  He did report that they left.  Daughter and their kids left and he has not idea where they are.  His other daughter is also SRI. This is very hard and acceptance is what he works on.     He has 18 yrs sobriety.  The treatment for hep C was a trigger to opioid use.  He has not had any alcohol for 20+years.  He  "continues to have sponsees and had a great sponsor  In the past.  He lives one day at a time.   Last use of drugs was 1988, He does \"the next right thing.\" This is his philosophy and has been a source of peace and serenity.     He would like to visit a sponsee out of state if he could find the money to do it.     He continues to do zoom  AA due to health concerns until he gets a booster shot in the fall. He does not like driving at night.   He has great community with persons in recovery.    The gift is that he has his sobriety and is using the program to live his life.         checked:  He continues to be prescribed oxycodone #60 every 3 months, 5 mg #60 and valium 5 mg #15 every 3 months.  I do not have concerns about his use of it; he takes them appropriately.   He takes oxy 3x/week and it takes the edge off his pain . Pain is hip, groin, legs. Valium is only for muscle spasm and is very effective.      SH: He  misses his grandkids; he does not know the whereabouts of them.   Previously he reported that  his daughter had a drinking problem.  He does not know the reason for her psych hospitalization. She has 2 kids but he has had no relationsnhip with them because of his daughter who has \"thrown him under the bus.\".He knows his grandson is having behavior problems but his daughter is unwilling to talk to him.     Hx: he has had a pinched nerve in his back but will not be doing surgery.' It comes and goes and today it is ok.  He may need knee surgery but he wants injections instead.  I believe he has intermittent rxs for short acting opioid when pain becomes worse.  I have not had any concerns about this though he is aware that it likely will not be very helpful because of being on sub.    Pain is manageable with the current dose of subutex.  Worst SE is sexual.  Also feels that he gets forgetful. He has trouble concentrating. He sleeps better without a benzo.   He has a home health aid from [employer], an agency " through [employer].     Medical HX: Pain, torn meniscus in his hip. If he steps wrong, he gets sharp pain  .   Current Outpatient Medications:      buprenorphine (SUBUTEX) 2 MG SUBL sublingual tablet, Place 10 tablets (20 mg) under the tongue daily, Disp: 300 tablet, Rfl: 2     emtricitabine-tenofovir (TRUVADA) 200-300 MG per tablet, Take 1 tablet by mouth daily., Disp: , Rfl:      Magnesium Oxide 500 MG CAPS, , Disp: , Rfl:      Raltegravir Potassium (ISENTRESS PO), Take  by mouth., Disp: , Rfl:      He takes the Subutex spread out during the day.   SE: sexual dysfunction,  He is comfortable on this dose physically and does not think it possible after 12 yrs of being on the med, to go off even with a slow taper. It has not worked in the past; he becomes too uncomfortable.     ROS: Pain in his leg , remainder of comprehensive neg except as above      MENTAL STATUS EXAM:  Oriented. Mood is good, affect consistent.  . Thought processes are logical and goal directed. Thought content is normal. Associations intact. Speech RRR, language fluent, concentration good, fund of knowledge good, memory intact, Insight and judgment are good. Gait n/a, cognition appears intact but not formally tested.       ASSESSMENT:   Opioid dependence, in full remission, on agonist;   AIDS, stable for many years  Chronic pain, s/p rotator cuff surgery.,   s/p dental extractions.  No concerning use of opiate or valium rxs  Venous insufficiency,   groin pain, back pain , knee pain,   S/P DVT,      PLAN:    No change in dose of subutex . RTC 3 months,  in person and will then do urine drug screen.       Psychiatry Clinic Individual Psychotherapy Note                                                                     [16]   Start time - 200pm        End time - 216pm  Date last reviewed -2-24-20      Date next due -5-24-20    Subjective: This supportive psychotherapy session addressed issues related to current stressors consisting of  financial, family.  Patient's reaction: Action in the context of mental status appropriate for ambulatory setting.  Progress: good  Plan: RTC 3mo  Psychotherapy services during this visit included  myself and the patient.   Treatment Plan      SYMPTOMS; PROBLEMS   MEASURABLE GOALS;    FUNCTIONAL IMPROVEMENT INTERVENTIONS;   GAINS MADE DISCHARGE CRITERIA   Substance Use: opioids   maintaining stable recovery, abstinence, recovery activities building on strengths  communication skills  community/ family support  increase coping skills  medications   self-care skills sustained recovery and medications not needed   Psychosocial: access to healthcare, financial hardship and relationship stress     Physical health: pain   locate resources, information to aid in decision making about health care     Improved pain acceptance of limitations/reality  communication skills  self-care skills, AA program    Appointment with surgeon symptom resolution

## 2022-11-19 ENCOUNTER — HEALTH MAINTENANCE LETTER (OUTPATIENT)
Age: 67
End: 2022-11-19

## 2022-12-06 ENCOUNTER — LAB (OUTPATIENT)
Dept: LAB | Facility: CLINIC | Age: 67
End: 2022-12-06
Attending: PSYCHIATRY & NEUROLOGY
Payer: MEDICARE

## 2022-12-06 ENCOUNTER — OFFICE VISIT (OUTPATIENT)
Dept: PSYCHIATRY | Facility: CLINIC | Age: 67
End: 2022-12-06
Attending: PSYCHIATRY & NEUROLOGY
Payer: MEDICARE

## 2022-12-06 VITALS
DIASTOLIC BLOOD PRESSURE: 87 MMHG | HEART RATE: 62 BPM | WEIGHT: 219.4 LBS | SYSTOLIC BLOOD PRESSURE: 147 MMHG | BODY MASS INDEX: 30.6 KG/M2

## 2022-12-06 DIAGNOSIS — F11.21 OPIOID DEPENDENCE IN REMISSION (H): ICD-10-CM

## 2022-12-06 DIAGNOSIS — F19.11 OTHER PSYCHOACTIVE SUBSTANCE ABUSE, IN REMISSION (H): ICD-10-CM

## 2022-12-06 PROCEDURE — 90833 PSYTX W PT W E/M 30 MIN: CPT | Performed by: PSYCHIATRY & NEUROLOGY

## 2022-12-06 PROCEDURE — 99214 OFFICE O/P EST MOD 30 MIN: CPT | Performed by: PSYCHIATRY & NEUROLOGY

## 2022-12-06 PROCEDURE — G0463 HOSPITAL OUTPT CLINIC VISIT: HCPCS

## 2022-12-06 PROCEDURE — 80307 DRUG TEST PRSMV CHEM ANLYZR: CPT

## 2022-12-06 PROCEDURE — 80299 QUANTITATIVE ASSAY DRUG: CPT

## 2022-12-06 RX ORDER — BUPRENORPHINE 2 MG/1
20 TABLET SUBLINGUAL DAILY
Qty: 300 TABLET | Refills: 2 | Status: SHIPPED | OUTPATIENT
Start: 2022-12-06 | End: 2023-03-07

## 2022-12-06 ASSESSMENT — PAIN SCALES - GENERAL: PAINLEVEL: NO PAIN (0)

## 2022-12-06 NOTE — PROGRESS NOTES
"Mauro Tamez is a 67 year old who has consented to receive services via billable video visit.      Pt will join video visit via: Master Route  If there are problems joining the visit, send backup video invite via: Tigerspike      Originating Location (patient location): Patient's home  Distant Location (provider location): [facility] [place] [facility]    Will anyone else be joining the video visit? No    How would you prefer to obtain AVS?: Fastly     Telehealth Details  Type of service:  video visit for medication management  Time of service:    Start Time:  200pm    End Time:  225    Originating Site (patient location):  [facility]   Location- Patient's home  Distant Site (provider location):  [facility]  Mode of Communication:  Video conference via Master Route    Progress Notes   Khloe Boyce MD   Psychiatry       PSYCHIATRY VISIT    The patient was seen for evaluation and management of opioid use disorder.  He is seen in person.   The patient was last seen 3 mo ago.     INTERIM HX: At last visit:  No change in dose of subutex . RTC 3 months,    Drug screen not done., due to remote visit    He is overall doing well; good motivation, interests.  His main issue is dealing with his wife who is ill psychiatrically but won't seek help.  He describes her mood swings which are dramatic.  She will become angry and take everything out of cabinets for example because it is \"not right\".  This includes patients things in the garage.  She has atrial fib and heart disease.     His energy level can be low and he will take naps.  He has not c/o sleep difficulty.     Not having their grandkids is painful.  He is able to accept that it is out of his control but his wife cannot and she spends hours searching for any clue on the web. .  The last contact has been March 2022.      He has 18 yrs sobriety.  The treatment for hep C was a trigger to opioid use.  He has not had any alcohol " "for 20+years.  He continues to have sponsees and had a great sponsor  In the past.  He lives one day at a time.   Last use of drugs was 1988, He does \"the next right thing.\" This is his philosophy and has been a source of peace and serenity.. He continues to do zoom  AA due to health concerns    He has great community with persons in recovery.    The gift is that he has his sobriety and is using the program to live his life.         checked:  He continues to be prescribed oxycodone #60 every 3 months, 5 mg #60 and valium 5 mg #15 every 3 months.  I do not have concerns about his use of it; he takes them appropriately.   He takes oxy 3x/week and it takes the edge off his pain . Pain is hip, groin, legs. Valium is only for muscle spasm and is very effective. He only uses this very infrequently.      SH: He  misses his grandkids; he does not know the whereabouts of them.   Previously he reported that  his daughter had a drinking problem.  He does not know the reason for her psych hospitalization. She has 2 kids but he has had no relationsnhip with them because of his daughter who has \"thrown him under the bus.\".He knows his grandson is having behavior problems but his daughter is unwilling to talk to him.     Hx: he has had a pinched nerve in his back but will not be doing surgery.' It comes and goes and today it is ok.  He may need knee surgery but he wants injections instead.  I believe he has intermittent rxs for short acting opioid when pain becomes worse.  I have not had any concerns about this though he is aware that it likely will not be very helpful because of being on sub.    Pain is manageable with the current dose of subutex.  Worst SE is sexual.  Also feels that he gets forgetful. He has trouble concentrating. He sleeps better without a benzo.   He has a home health aid from [employer], an agency through [employer].     Medical HX: Pain, torn meniscus in his hip. If he steps wrong, he gets " sharp pain  .   Current Outpatient Medications:      buprenorphine (SUBUTEX) 2 MG SUBL sublingual tablet, Place 10 tablets (20 mg) under the tongue daily, Disp: 300 tablet, Rfl: 2     emtricitabine-tenofovir (TRUVADA) 200-300 MG per tablet, Take 1 tablet by mouth daily., Disp: , Rfl:      Magnesium Oxide 500 MG CAPS, , Disp: , Rfl:      Raltegravir Potassium (ISENTRESS PO), Take  by mouth., Disp: , Rfl:      He takes the Subutex spread out during the day.   SE: sexual dysfunction,  He is comfortable on this dose physically and does not think it possible after 12 yrs of being on the med, to go off even with a slow taper. It has not worked in the past; he becomes too uncomfortable.     ROS: Pain in his leg , remainder of comprehensive neg except as above      MENTAL STATUS EXAM:  Oriented. Mood is good, affect consistent.  . Thought processes are logical and goal directed. Thought content is normal. Associations intact. Speech RRR, language fluent, concentration good, fund of knowledge good, memory intact, Insight and judgment are good. Gait n/a, cognition appears intact but not formally tested.       ASSESSMENT:   Opioid dependence, in full remission, on agonist;   AIDS, stable for many years  Chronic pain, s/p rotator cuff surgery.,   s/p dental extractions.  No concerning use of opiate or valium rxs  Venous insufficiency,   groin pain, back pain , knee pain,   S/P DVT,      PLAN:    No change in dose of subutex . RTC 3 months,  in person and will  do urine drug screen.   Urine drug screen today      Psychiatry Clinic Individual Psychotherapy Note                                                                     [16]   Start time - 200pm        End time - 216pm  Date last reviewed -2-24-20      Date next due -5-24-20    Subjective: This supportive psychotherapy session addressed issues related to current stressors consisting of financial, family.  Patient's reaction: Action in the context of mental status  appropriate for ambulatory setting.  Progress: good  Plan: RTC 3mo  Psychotherapy services during this visit included  myself and the patient.   Treatment Plan      SYMPTOMS; PROBLEMS   MEASURABLE GOALS;    FUNCTIONAL IMPROVEMENT INTERVENTIONS;   GAINS MADE DISCHARGE CRITERIA   Substance Use: opioids   maintaining stable recovery, abstinence, recovery activities building on strengths  communication skills  community/ family support  increase coping skills  medications   self-care skills sustained recovery and medications not needed   Psychosocial: access to healthcare, financial hardship and relationship stress     Physical health: pain   locate resources, information to aid in decision making about health care     Improved pain acceptance of limitations/reality  communication skills  self-care skills, AA program    Appointment with surgeon symptom resolution

## 2022-12-09 LAB
BUPRENORPHINE UR-MCNC: 339 NG/ML
BUPRENORPHINE UR-MCNC: 40 NG/ML
NALOXONE UR CFM-MCNC: <100 NG/ML
NORBUPRENORPHINE UR CFM-MCNC: 242 NG/ML
NORBUPRENORPHINE UR-MCNC: 72 NG/ML

## 2023-03-07 ENCOUNTER — OFFICE VISIT (OUTPATIENT)
Dept: PSYCHIATRY | Facility: CLINIC | Age: 68
End: 2023-03-07
Attending: PSYCHIATRY & NEUROLOGY
Payer: MEDICARE

## 2023-03-07 VITALS
HEART RATE: 77 BPM | BODY MASS INDEX: 30.99 KG/M2 | WEIGHT: 222.2 LBS | SYSTOLIC BLOOD PRESSURE: 160 MMHG | DIASTOLIC BLOOD PRESSURE: 85 MMHG

## 2023-03-07 DIAGNOSIS — F11.21 OPIOID DEPENDENCE IN REMISSION (H): ICD-10-CM

## 2023-03-07 PROCEDURE — G0463 HOSPITAL OUTPT CLINIC VISIT: HCPCS | Performed by: PSYCHIATRY & NEUROLOGY

## 2023-03-07 PROCEDURE — 90833 PSYTX W PT W E/M 30 MIN: CPT | Performed by: PSYCHIATRY & NEUROLOGY

## 2023-03-07 PROCEDURE — 99214 OFFICE O/P EST MOD 30 MIN: CPT | Performed by: PSYCHIATRY & NEUROLOGY

## 2023-03-07 RX ORDER — BUPRENORPHINE 2 MG/1
20 TABLET SUBLINGUAL DAILY
Qty: 300 TABLET | Refills: 2 | Status: SHIPPED | OUTPATIENT
Start: 2023-03-07 | End: 2023-06-06

## 2023-03-07 ASSESSMENT — PAIN SCALES - GENERAL: PAINLEVEL: NO PAIN (0)

## 2023-03-07 NOTE — PROGRESS NOTES
"  Progress Notes   Khloe Boyce MD   Psychiatry       PSYCHIATRY VISIT    The patient was seen for evaluation and management of opioid use disorder.  He is seen in person.   The patient was last seen 3 mo ago.     INTERIM HX: At last visit:  No change in dose of subutex . RTC 3 months,       Patient continues to do well. He will be getting out more with better weather.   Patient is wanting to lose weight.    He is concerned about his wife who has mental health problems and isolating.  He is contacting her friends in order to call her.     Mood is ok, energy is fair, depending on weather. He is taking vit D.    Sleep is poor; always up 2-3x/nitght.   He is taking more Mg because of leg cramps.    Pretty good motivation, interests.  His main issue is dealing with his wife who is ill psychiatrically but won't seek help.  He describes her mood swings which are dramatic.  While he has interests, he does not have the energy.  His energy level can be low and he will take naps.      No longer sponsors anyone. It is draining if they are not responsive.    Not having their grandkids is painful.  He is able to accept that it is out of his control but his wife cannot .The last contact has been March 2022.  We did not discuss it at this visit.     He has 18 + yrs sobriety.  The treatment for hep C was a trigger to opioid use.  He has not had any alcohol for 20+years.    Last use of drugs was 1988, He does \"the next right thing.\" This is his philosophy and has been a source of peace and serenity.. He continues to do zoom  AA due to health concerns    He has great community with persons in recovery.    The gift is that he has his sobriety and is using the program to live his life.       checked: as reported by patient.  Takes oxy 3-4 x/week when increased pain.  No valium for months.  I do not have concerns about his use of it; he takes them appropriately.   .    SH: He  misses his grandkids; he does not know the " "whereabouts of them.   Previously he reported that  his daughter had a drinking problem.  He does not know the reason for her psych hospitalization. She has 2 kids but he has had no relationsnhip with them because of his daughter who has \"thrown him under the bus.\".He knows his grandson is having behavior problems but his daughter is unwilling to talk to him.     Hx: he has had a pinched nerve in his back but will not be doing surgery.' It comes and goes and today it is ok.  He may need knee surgery but he wants injections instead.  I believe he has intermittent rxs for short acting opioid when pain becomes worse.  I have not had any concerns about this though he is aware that it likely will not be very helpful because of being on sub.    Pain is manageable with the current dose of subutex.  Worst SE is sexual.  Also feels that he gets forgetful. He has trouble concentrating. He sleeps better without a benzo.   He has a home health aid from [employer], an agency through [employer].     Medical HX: Pain, torn meniscus in his hip. If he steps wrong, he gets sharp pain. Venous insufficiency , varicose veins  .   Current Outpatient Medications:      buprenorphine (SUBUTEX) 2 MG SUBL sublingual tablet, Place 10 tablets (20 mg) under the tongue daily, Disp: 300 tablet, Rfl: 2     emtricitabine-tenofovir (TRUVADA) 200-300 MG per tablet, Take 1 tablet by mouth daily., Disp: , Rfl:      Magnesium Oxide 500 MG CAPS, , Disp: , Rfl:      Raltegravir Potassium (ISENTRESS PO), Take  by mouth., Disp: , Rfl:    Zinc  Vit D    He takes the Subutex spread out during the day.   SE: sexual dysfunction,  He is comfortable on this dose physically and does not think it possible after 12 yrs of being on the med, to go off even with a slow taper. It has not worked in the past; he becomes too uncomfortable.     ROS: Pain in his leg   Veins are problematic. He was advised to have another surgery.  .  Patient was concerned about " liver but all tests are normal. He used to take milk thistle (for liver) but too expensive.  Tudca - herb in pill form to aid liver function.        MENTAL STATUS EXAM:  Oriented. Mood is good, affect consistent.  . Thought processes are logical and goal directed. Thought content is normal. Associations intact. Speech RRR, language fluent, concentration good, fund of knowledge good, memory intact, Insight and judgment are good. Gait n/a, cognition appears intact but not formally tested.       ASSESSMENT:   Opioid dependence, in full remission, on agonist;   AIDS, stable for many years  Chronic pain, s/p rotator cuff surgery.,   No concerning use of opiate or valium rxs  Venous insufficiency,   groin pain, back pain , knee pain,   S/P DVT,      PLAN:    No change in dose of subutex . RTC 3 months,  in person and will  do urine drug screen.   Discussed transitioning from me to his PCP as I will be ending my clinic. He will ask his primary tomorrow (at [facility]).       Psychiatry Clinic Individual Psychotherapy Note                                                                     [16]   Start time - 100pm        End time - 116pm  Date last reviewed -2-24-20      Date next due -5-24-20    Subjective: This supportive psychotherapy session addressed issues related to current stressors consisting of financial, family.  Patient's reaction: Action in the context of mental status appropriate for ambulatory setting.  Progress: good  Plan: RTC 3mo  Psychotherapy services during this visit included  myself and the patient.   Treatment Plan      SYMPTOMS; PROBLEMS   MEASURABLE GOALS;    FUNCTIONAL IMPROVEMENT INTERVENTIONS;   GAINS MADE DISCHARGE CRITERIA   Substance Use: opioids   maintaining stable recovery, abstinence, recovery activities building on strengths  communication skills  community/ family support  increase coping skills  medications   self-care skills sustained recovery and medications not needed   Psychosocial:  access to healthcare, financial hardship and relationship stress     Physical health: pain   locate resources, information to aid in decision making about health care     Improved pain acceptance of limitations/reality  communication skills  self-care skills, AA program    Appointment with surgeon symptom resolution

## 2023-03-14 ENCOUNTER — TELEPHONE (OUTPATIENT)
Dept: PSYCHIATRY | Facility: CLINIC | Age: 68
End: 2023-03-14
Payer: MEDICARE

## 2023-03-14 NOTE — TELEPHONE ENCOUNTER
Writer received incoming call from patient wanting to connect with provider. Patient was notified by Dr. Boyce that she is retiring in June. Patient was asked to reach out to his PCP to see if they are willing to take over care. Patient was notified by his PCP that they do not feel comfortable taking over prescribing psych medications. Patient is wanting to connect with Dr. Boyce for her recommendations on providers that can take over his care outside the clinic.

## 2023-03-15 NOTE — TELEPHONE ENCOUNTER
Message  Received: Yesterday       Khloe Boyce MD Blake, Katherine J RN       Caller: Unspecified (1 week ago)                     Antonietta,   I am aware of the rxs;  Patient has been forthcoming about it and explained the agreement he has with his primary about the rxs.  He is allowed to take the # on scripts but if he doesn't then he destroys the rxs each month.  He says he does receive some benefit with the intermittent use of the rxed opioid.  The patient is very much aware of the risks of bz's alessandra with opioids and uses them judiciously.  While I am not very comfortable with this regimen, he has some  Very challenging medical issues and this allows him to function.     Thanks,   SS          hard copy, drawn during this pregnancy

## 2023-03-16 NOTE — TELEPHONE ENCOUNTER
Khloe Boyce MD  You 17 hours ago (4:23 PM)     SS  He could be referred to one of our new addiction fellows;  it would make it simpler.  I thought optimal would be for PCP to manage but not many are comfortaable doing this.   SS      Attempted to reach out to patient to relay above information. No answer at number provided. Updated him that Dr. Boyce will continue to manage meds until he is transferred to a new provider. Requested a call back for additional questions. Clinic number provided.

## 2023-04-09 ENCOUNTER — HEALTH MAINTENANCE LETTER (OUTPATIENT)
Age: 68
End: 2023-04-09

## 2023-05-30 ENCOUNTER — TELEPHONE (OUTPATIENT)
Dept: PSYCHIATRY | Facility: CLINIC | Age: 68
End: 2023-05-30
Payer: MEDICARE

## 2023-05-30 NOTE — TELEPHONE ENCOUNTER
[facility] Call Center    Phone Message    May a detailed message be left on voicemail: yes     Reason for Call: Other: Pt would like to speak with Dr. Boyce/RN support concerning continuitiy of care and finding a perscriber than can fill medications for patients when Dr. Boyce will no longer being seing patient. Pt said that he would ideally like more information prior to next scheduled appointment on June 6th.     Action Taken: Message routed to:  Other: P PSYCHIATRY NURSE-P    Travel Screening: Not Applicable

## 2023-05-30 NOTE — TELEPHONE ENCOUNTER
RN returned phone call to pt. No answer. LVM requesting callback.    Per 3/14/23 telephone encounter, Dr. Boyce recommends pt transfer to addiction med fellow, however scheduling is not yet available. Left brief overview of this plan on pt's VM. Encouraged pt to contact clinic with questions. Pt has appointment with Dr. Boyce next week.

## 2023-06-06 ENCOUNTER — OFFICE VISIT (OUTPATIENT)
Dept: PSYCHIATRY | Facility: CLINIC | Age: 68
End: 2023-06-06
Attending: PSYCHIATRY & NEUROLOGY
Payer: MEDICARE

## 2023-06-06 ENCOUNTER — LAB (OUTPATIENT)
Dept: LAB | Facility: CLINIC | Age: 68
End: 2023-06-06
Attending: PSYCHIATRY & NEUROLOGY
Payer: MEDICARE

## 2023-06-06 VITALS
HEART RATE: 79 BPM | WEIGHT: 218.6 LBS | DIASTOLIC BLOOD PRESSURE: 81 MMHG | SYSTOLIC BLOOD PRESSURE: 123 MMHG | BODY MASS INDEX: 30.49 KG/M2

## 2023-06-06 DIAGNOSIS — Z79.899 ENCOUNTER FOR LONG-TERM (CURRENT) USE OF MEDICATIONS: ICD-10-CM

## 2023-06-06 DIAGNOSIS — Z79.899 ENCOUNTER FOR LONG-TERM (CURRENT) USE OF MEDICATIONS: Primary | ICD-10-CM

## 2023-06-06 DIAGNOSIS — F11.21 OPIOID DEPENDENCE IN REMISSION (H): ICD-10-CM

## 2023-06-06 LAB
AMPHETAMINES UR QL SCN: NORMAL
BARBITURATES UR QL SCN: NORMAL
BENZODIAZ UR QL SCN: NORMAL
BZE UR QL SCN: NORMAL
CANNABINOIDS UR QL SCN: NORMAL
ETHANOL UR QL SCN: NORMAL
OPIATES UR QL SCN: NORMAL
PCP QUAL URINE (ROCHE): NORMAL

## 2023-06-06 PROCEDURE — 80307 DRUG TEST PRSMV CHEM ANLYZR: CPT

## 2023-06-06 PROCEDURE — 90833 PSYTX W PT W E/M 30 MIN: CPT | Performed by: PSYCHIATRY & NEUROLOGY

## 2023-06-06 PROCEDURE — 99214 OFFICE O/P EST MOD 30 MIN: CPT | Performed by: PSYCHIATRY & NEUROLOGY

## 2023-06-06 PROCEDURE — G0463 HOSPITAL OUTPT CLINIC VISIT: HCPCS | Performed by: PSYCHIATRY & NEUROLOGY

## 2023-06-06 RX ORDER — BUPRENORPHINE 2 MG/1
20 TABLET SUBLINGUAL DAILY
Qty: 300 TABLET | Refills: 2 | Status: SHIPPED | OUTPATIENT
Start: 2023-06-06 | End: 2023-09-13

## 2023-06-06 ASSESSMENT — PAIN SCALES - GENERAL: PAINLEVEL: NO PAIN (0)

## 2023-06-06 NOTE — PROGRESS NOTES
"  Progress Notes   Khloe Boyce MD   Psychiatry       PSYCHIATRY VISIT    The patient was seen for evaluation and management of opioid use disorder.  He is seen in person.   The patient was last seen 3 mo ago.     INTERIM HX: At last visit:  No change in dose of subutex . RTC 3 months,       Patient continues to do well.   He is asking about who to follow up with with my leaving clinic. We decided that our addiction clinic would be appropriate.     We reviewed his opioid obsession: it was profound and he couldn't stop oxy.  Describes unable to stop use once starts.  He has been on suboxone since 2005.     He hurts every day  He drinks water, eats well.  He likes to take the 2 mg suboxone multiple times/day;    Pain is in his back , joints.  He manages it .  Ibuprofen, Alleve upsets his stomach and can't take Tylenol.  Taking an occasional oxycodone 5 mg works for pain out of control and allows him to function.  He gets 60 for 3 months.  There is no concern about misuse of it. His PCP prescribes it.  Valium would be only with severe back spasms and is very rare.  He has them ; not needed in a long time    His major concern is his wife's health.  She has Afib and CHF and he is trying to get her into a cardiologist. Her depression is problematic because she won't take an anti-depressant . She ruminates, is negative, worries.        Mood is ok, energy is ok, sleep is good.    He is taking  Mg because of leg cramps.    Continues to be very active in recovery.     He has 18 + yrs sobriety.  The treatment for hep C was a trigger to opioid use.  He has not had any alcohol for 20+years.    Last use of drugs was 1988, He does \"the next right thing.\" This is his philosophy and has been a source of peace and serenity.. He continues to do zoom  AA due to health concerns    He has great community with persons in recovery.    The gift is that he has his sobriety and is using the program to live his life.       checked: as " "reported by patient.  Takes oxy 3-4 x/week when increased pain. About every 3 months he receives Valium #15 and oxy #60.  I do not have concerns about misuse  of it; he takes them as prescribed.   .    SH: He  misses his grandkids; he does not know the whereabouts of them. Not having their grandkids is painful.  He is able to accept that it is out of his control but his wife cannot .The last contact has been March 2022.  Previously he reported that  his daughter had a drinking problem.   She has 2 kids but he has had no relationsnhip with them because of his daughter who has \"thrown him under the bus.\".He knows his grandson is having behavior problems but his daughter is unwilling to talk to him.     MED HX: he has had a pinched nerve in his back but will not be doing surgery.' It comes and goes and today it is ok.  He may need knee surgery but he wants injections instead.  Pain is manageable with the current dose of subutex.  Worst SE is sexual.  Also feels that he gets forgetful. He has trouble concentrating. He sleeps better without a benzo.   He has a home health aid from [facility], an agency through "[employer] project. Viral load undetectable.  :Pain, torn meniscus in his hip. If he steps wrong, he gets sharp pain. Venous insufficiency , varicose veins  .   Current Outpatient Medications:      buprenorphine (SUBUTEX) 2 MG SUBL sublingual tablet, Place 10 tablets (20 mg) under the tongue daily, Disp: 300 tablet, Rfl: 2     emtricitabine-tenofovir (TRUVADA) 200-300 MG per tablet, Take 1 tablet by mouth daily., Disp: , Rfl:      Magnesium Oxide 500 MG CAPS, , Disp: , Rfl:      Raltegravir Potassium (ISENTRESS PO), Take  by mouth., Disp: , Rfl:    Zinc  Vit D  Tudca - herb in pill form to aid liver function.    He takes the Subutex spread out during the day.   SE: sexual dysfunction,  He is comfortable on this dose physically and does not think it possible after 12 yrs of being on the med, to go off even with a " slow taper. It has not worked in the past; he becomes too uncomfortable.         MENTAL STATUS EXAM:  Oriented. Mood is good, affect consistent.  . Thought processes are logical and goal directed. Thought content is normal. Associations intact. Speech RRR, language fluent, concentration good, fund of knowledge good, memory intact, Insight and judgment are good. Gait n/a, cognition appears intact but not formally tested.       ASSESSMENT:   Opioid dependence, in full remission, on agonist;   AIDS, stable for many years  Chronic pain, s/p rotator cuff surgery.,   No concerning use of opiate or valium rxs  Venous insufficiency,   groin pain, back pain , knee pain,   S/P DVT,      PLAN:    No change in dose of subutex . RTC 3 months,  in person and will  do urine drug screen.   Discussed transitioning from me to his PCP as I will be ending my clinic. He will ask his primary .Alternatively, he will be followed in our addiction clinic.       Psychiatry Clinic Individual Psychotherapy Note                                                                     [16]   Start time - 100pm        End time - 116pm  Date last reviewed -2-24-20      Date next due -5-24-20    Subjective: This supportive psychotherapy session addressed issues related to current stressors consisting of financial, family.  Patient's reaction: Action in the context of mental status appropriate for ambulatory setting.  Progress: good  Plan: RTC 3mo  Psychotherapy services during this visit included  myself and the patient.   Treatment Plan      SYMPTOMS; PROBLEMS   MEASURABLE GOALS;    FUNCTIONAL IMPROVEMENT INTERVENTIONS;   GAINS MADE DISCHARGE CRITERIA   Substance Use: opioids   maintaining stable recovery, abstinence, recovery activities building on strengths  communication skills  community/ family support  increase coping skills  medications   self-care skills sustained recovery and medications not needed   Psychosocial: access to healthcare,  financial hardship and relationship stress     Physical health: pain   locate resources, information to aid in decision making about health care     Improved pain acceptance of limitations/reality  communication skills  self-care skills, AA program    Appointment with surgeon symptom resolution

## 2023-09-12 ASSESSMENT — PATIENT HEALTH QUESTIONNAIRE - PHQ9
SUM OF ALL RESPONSES TO PHQ QUESTIONS 1-9: 2
10. IF YOU CHECKED OFF ANY PROBLEMS, HOW DIFFICULT HAVE THESE PROBLEMS MADE IT FOR YOU TO DO YOUR WORK, TAKE CARE OF THINGS AT HOME, OR GET ALONG WITH OTHER PEOPLE: NOT DIFFICULT AT ALL
SUM OF ALL RESPONSES TO PHQ QUESTIONS 1-9: 2

## 2023-09-13 ENCOUNTER — OFFICE VISIT (OUTPATIENT)
Dept: PSYCHIATRY | Facility: CLINIC | Age: 68
End: 2023-09-13
Attending: PSYCHIATRY & NEUROLOGY
Payer: MEDICARE

## 2023-09-13 ENCOUNTER — LAB (OUTPATIENT)
Dept: LAB | Facility: CLINIC | Age: 68
End: 2023-09-13
Attending: PSYCHIATRY & NEUROLOGY
Payer: MEDICARE

## 2023-09-13 VITALS
SYSTOLIC BLOOD PRESSURE: 128 MMHG | HEART RATE: 82 BPM | BODY MASS INDEX: 30.54 KG/M2 | WEIGHT: 219 LBS | DIASTOLIC BLOOD PRESSURE: 86 MMHG

## 2023-09-13 DIAGNOSIS — F11.21 OPIOID DEPENDENCE IN REMISSION (H): ICD-10-CM

## 2023-09-13 DIAGNOSIS — F19.11 OTHER PSYCHOACTIVE SUBSTANCE ABUSE, IN REMISSION (H): ICD-10-CM

## 2023-09-13 DIAGNOSIS — Z79.899 ENCOUNTER FOR LONG-TERM (CURRENT) USE OF MEDICATIONS: ICD-10-CM

## 2023-09-13 LAB
AMPHETAMINES UR QL SCN: NORMAL
BARBITURATES UR QL SCN: NORMAL
BENZODIAZ UR QL SCN: NORMAL
BZE UR QL SCN: NORMAL
CANNABINOIDS UR QL SCN: NORMAL
FENTANYL UR QL: NORMAL
FENTANYL UR QL: NORMAL
OPIATES UR QL SCN: NORMAL
PCP QUAL URINE (ROCHE): NORMAL

## 2023-09-13 PROCEDURE — 99214 OFFICE O/P EST MOD 30 MIN: CPT | Mod: GC | Performed by: STUDENT IN AN ORGANIZED HEALTH CARE EDUCATION/TRAINING PROGRAM

## 2023-09-13 PROCEDURE — 80307 DRUG TEST PRSMV CHEM ANLYZR: CPT

## 2023-09-13 PROCEDURE — 80299 QUANTITATIVE ASSAY DRUG: CPT

## 2023-09-13 RX ORDER — BUPRENORPHINE 2 MG/1
20 TABLET SUBLINGUAL DAILY
Qty: 300 TABLET | Refills: 2 | Status: SHIPPED | OUTPATIENT
Start: 2023-09-13 | End: 2023-12-13

## 2023-09-13 ASSESSMENT — PAIN SCALES - GENERAL: PAINLEVEL: SEVERE PAIN (7)
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
## 2023-09-13 NOTE — NURSING NOTE
Chief Complaint   Patient presents with    Eval/Assessment     Encounter for long-term (current) use of medications     - Salvatore Amezcua, Visit Facilitator

## 2023-09-16 LAB
BUPRENORPHINE UR-MCNC: 346 NG/ML
BUPRENORPHINE UR-MCNC: 62 NG/ML
NALOXONE UR CFM-MCNC: <100 NG/ML
NORBUPRENORPHINE UR CFM-MCNC: 158 NG/ML
NORBUPRENORPHINE UR-MCNC: 105 NG/ML

## 2023-10-27 ENCOUNTER — TELEPHONE (OUTPATIENT)
Dept: VASCULAR SURGERY | Facility: CLINIC | Age: 68
End: 2023-10-27
Payer: MEDICARE

## 2023-10-27 NOTE — TELEPHONE ENCOUNTER
Caller: Mauro    Provider: MD Pavel Stanley    Detailed reason for call: Patient new burning sensation that started on Wednesday.  He is concerned about a possible blood clot and would like a call back to advise.  He last saw Dr. Stanley in 2021.     Best phone number to contact: [phone]    Best time to contact: any    Ok to leave a detailed message: Yes    Ok to speak to authorized person if needed: No-phone is his cell phone, he's the only one that answers      (Noted to patient if reason is related to wound or incision, to please send a photo via email or [employer].)

## 2023-10-27 NOTE — TELEPHONE ENCOUNTER
Spoke with patient.  He states he is concerned about possible blood clot in his leg.  Advised patient to go to walk in or urgent care to be evaluated.  Unable to place ultrasound order to check for DVT as patient has not been seen since 2021.  Patient states understanding.

## 2023-11-03 ENCOUNTER — TRANSCRIBE ORDERS (OUTPATIENT)
Dept: OTHER | Age: 68
End: 2023-11-03

## 2023-11-03 DIAGNOSIS — I83.93 VARICOSE VEINS OF BOTH LOWER EXTREMITIES, UNSPECIFIED WHETHER COMPLICATED: Primary | ICD-10-CM

## 2023-12-13 ENCOUNTER — OFFICE VISIT (OUTPATIENT)
Dept: PSYCHIATRY | Facility: CLINIC | Age: 68
End: 2023-12-13
Attending: PSYCHIATRY & NEUROLOGY
Payer: MEDICARE

## 2023-12-13 VITALS
WEIGHT: 212.6 LBS | BODY MASS INDEX: 29.65 KG/M2 | HEART RATE: 82 BPM | SYSTOLIC BLOOD PRESSURE: 154 MMHG | DIASTOLIC BLOOD PRESSURE: 93 MMHG

## 2023-12-13 DIAGNOSIS — F11.21 OPIOID DEPENDENCE IN REMISSION (H): ICD-10-CM

## 2023-12-13 PROCEDURE — G0463 HOSPITAL OUTPT CLINIC VISIT: HCPCS | Performed by: STUDENT IN AN ORGANIZED HEALTH CARE EDUCATION/TRAINING PROGRAM

## 2023-12-13 PROCEDURE — 99214 OFFICE O/P EST MOD 30 MIN: CPT | Mod: GC | Performed by: STUDENT IN AN ORGANIZED HEALTH CARE EDUCATION/TRAINING PROGRAM

## 2023-12-13 RX ORDER — BUPRENORPHINE 2 MG/1
20 TABLET SUBLINGUAL DAILY
Qty: 300 TABLET | Refills: 2 | Status: SHIPPED | OUTPATIENT
Start: 2023-12-22 | End: 2024-03-06

## 2023-12-13 ASSESSMENT — PAIN SCALES - GENERAL: PAINLEVEL: NO PAIN (0)
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
## 2024-03-06 ENCOUNTER — OFFICE VISIT (OUTPATIENT)
Dept: PSYCHIATRY | Facility: CLINIC | Age: 69
End: 2024-03-06
Attending: PSYCHIATRY & NEUROLOGY
Payer: MEDICARE

## 2024-03-06 VITALS
BODY MASS INDEX: 30.54 KG/M2 | WEIGHT: 219 LBS | DIASTOLIC BLOOD PRESSURE: 80 MMHG | HEART RATE: 80 BPM | SYSTOLIC BLOOD PRESSURE: 152 MMHG

## 2024-03-06 DIAGNOSIS — F11.21 OPIOID DEPENDENCE IN REMISSION (H): ICD-10-CM

## 2024-03-06 PROCEDURE — G0463 HOSPITAL OUTPT CLINIC VISIT: HCPCS | Performed by: STUDENT IN AN ORGANIZED HEALTH CARE EDUCATION/TRAINING PROGRAM

## 2024-03-06 PROCEDURE — 99214 OFFICE O/P EST MOD 30 MIN: CPT | Mod: GC | Performed by: STUDENT IN AN ORGANIZED HEALTH CARE EDUCATION/TRAINING PROGRAM

## 2024-03-06 RX ORDER — BUPRENORPHINE 2 MG/1
20 TABLET SUBLINGUAL DAILY
Qty: 300 TABLET | Refills: 2 | Status: SHIPPED | OUTPATIENT
Start: 2024-03-06 | End: 2024-06-05

## 2024-03-06 ASSESSMENT — PAIN SCALES - GENERAL: PAINLEVEL: NO PAIN (0)

## 2024-03-06 NOTE — NURSING NOTE
Chief Complaint   Patient presents with    Recheck Medication     Opioid dependence in remission     Pt says pcp took manual BP yester and it was 140s/70s    - Salvatore Amezcua, Visit Facilitator
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
## 2024-06-05 ENCOUNTER — OFFICE VISIT (OUTPATIENT)
Dept: PSYCHIATRY | Facility: CLINIC | Age: 69
End: 2024-06-05
Attending: PSYCHIATRY & NEUROLOGY
Payer: MEDICARE

## 2024-06-05 VITALS
HEART RATE: 67 BPM | BODY MASS INDEX: 29.99 KG/M2 | DIASTOLIC BLOOD PRESSURE: 82 MMHG | WEIGHT: 215 LBS | SYSTOLIC BLOOD PRESSURE: 143 MMHG

## 2024-06-05 DIAGNOSIS — F11.21 OPIOID DEPENDENCE IN REMISSION (H): ICD-10-CM

## 2024-06-05 PROCEDURE — 99214 OFFICE O/P EST MOD 30 MIN: CPT | Mod: GC | Performed by: STUDENT IN AN ORGANIZED HEALTH CARE EDUCATION/TRAINING PROGRAM

## 2024-06-05 PROCEDURE — G0463 HOSPITAL OUTPT CLINIC VISIT: HCPCS | Performed by: STUDENT IN AN ORGANIZED HEALTH CARE EDUCATION/TRAINING PROGRAM

## 2024-06-05 RX ORDER — BUPRENORPHINE 2 MG/1
20 TABLET SUBLINGUAL DAILY
Qty: 300 TABLET | Refills: 2 | Status: SHIPPED | OUTPATIENT
Start: 2024-06-05 | End: 2024-09-04

## 2024-06-05 RX ORDER — DIAZEPAM 5 MG
5 TABLET ORAL PRN
COMMUNITY
Start: 2024-03-05

## 2024-06-05 ASSESSMENT — PAIN SCALES - GENERAL: PAINLEVEL: SEVERE PAIN (7)
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
## 2024-06-05 NOTE — NURSING NOTE
Chief Complaint   Patient presents with    Recheck Medication     Opioid dependence in remission     Blood pressure (!) 155/82, pulse 68, weight 97.5 kg (215 lb).  Blood pressure (!) 143/82, pulse 67, weight 97.5 kg (215 lb).    - Salvatore Amezcua, Visit Facilitator

## 2024-06-09 NOTE — NURSING NOTE
Chief Complaint   Patient presents with    Recheck Medication     Opioid dependence in remission     - Salvatore Amezcua, Visit Facilitator     
General malaise and fatigue, fever, sore throat, cough

## 2024-06-15 ENCOUNTER — HEALTH MAINTENANCE LETTER (OUTPATIENT)
Age: 69
End: 2024-06-15

## 2024-09-04 ENCOUNTER — LAB (OUTPATIENT)
Dept: LAB | Facility: CLINIC | Age: 69
End: 2024-09-04
Attending: PSYCHIATRY & NEUROLOGY
Payer: MEDICARE

## 2024-09-04 ENCOUNTER — OFFICE VISIT (OUTPATIENT)
Dept: PSYCHIATRY | Facility: CLINIC | Age: 69
End: 2024-09-04
Attending: PSYCHIATRY & NEUROLOGY
Payer: MEDICARE

## 2024-09-04 VITALS
SYSTOLIC BLOOD PRESSURE: 144 MMHG | WEIGHT: 220.3 LBS | HEART RATE: 75 BPM | BODY MASS INDEX: 30.73 KG/M2 | DIASTOLIC BLOOD PRESSURE: 86 MMHG

## 2024-09-04 DIAGNOSIS — F11.21 OPIOID DEPENDENCE IN REMISSION (H): ICD-10-CM

## 2024-09-04 DIAGNOSIS — F11.20 OPIOID USE DISORDER, SEVERE, ON MAINTENANCE THERAPY (H): ICD-10-CM

## 2024-09-04 LAB
AMPHETAMINES UR QL SCN: NORMAL
BARBITURATES UR QL SCN: NORMAL
BENZODIAZ UR QL SCN: NORMAL
BUPRENORPHINE UR QL: ABNORMAL
BZE UR QL SCN: NORMAL
CANNABINOIDS UR QL SCN: NORMAL
FENTANYL UR QL: NORMAL
OPIATES UR QL SCN: NORMAL
PCP QUAL URINE (ROCHE): NORMAL

## 2024-09-04 PROCEDURE — 99214 OFFICE O/P EST MOD 30 MIN: CPT | Mod: GC | Performed by: STUDENT IN AN ORGANIZED HEALTH CARE EDUCATION/TRAINING PROGRAM

## 2024-09-04 PROCEDURE — 80307 DRUG TEST PRSMV CHEM ANLYZR: CPT

## 2024-09-04 PROCEDURE — G0463 HOSPITAL OUTPT CLINIC VISIT: HCPCS | Performed by: STUDENT IN AN ORGANIZED HEALTH CARE EDUCATION/TRAINING PROGRAM

## 2024-09-04 RX ORDER — BUPRENORPHINE 2 MG/1
20 TABLET SUBLINGUAL DAILY
Qty: 300 TABLET | Refills: 2 | Status: SHIPPED | OUTPATIENT
Start: 2024-09-04

## 2024-09-04 ASSESSMENT — PAIN SCALES - GENERAL: PAINLEVEL: SEVERE PAIN (7)

## 2024-09-04 NOTE — NURSING NOTE
Chief Complaint   Patient presents with    Recheck Medication     Opioid dependence in remission     - Salvatore Amezcua, Visit Facilitator
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
## 2024-12-04 ENCOUNTER — VIRTUAL VISIT (OUTPATIENT)
Dept: PSYCHIATRY | Facility: CLINIC | Age: 69
End: 2024-12-04
Attending: PSYCHIATRY & NEUROLOGY
Payer: MEDICARE

## 2024-12-04 DIAGNOSIS — F11.21 OPIOID DEPENDENCE IN REMISSION (H): ICD-10-CM

## 2024-12-04 DIAGNOSIS — F11.20 OPIOID USE DISORDER, SEVERE, ON MAINTENANCE THERAPY (H): Primary | ICD-10-CM

## 2024-12-04 PROCEDURE — 99214 OFFICE O/P EST MOD 30 MIN: CPT | Mod: 95 | Performed by: STUDENT IN AN ORGANIZED HEALTH CARE EDUCATION/TRAINING PROGRAM

## 2024-12-04 RX ORDER — BUPRENORPHINE 2 MG/1
20 TABLET SUBLINGUAL DAILY
Qty: 300 TABLET | Refills: 2 | Status: SHIPPED | OUTPATIENT
Start: 2024-12-04

## 2024-12-04 RX ORDER — BUPRENORPHINE 2 MG/1
20 TABLET SUBLINGUAL DAILY
Qty: 300 TABLET | Refills: 2 | Status: CANCELLED | OUTPATIENT
Start: 2024-12-04

## 2024-12-04 NOTE — NURSING NOTE
Current patient location:  [place]    Is the patient currently in the state [facility]? YES    Visit mode:VIDEO    If the visit is dropped, the patient can be reconnected by:VIDEO VISIT: Text to cell phone:   Telephone Information:   Mobile [phone]   Mobile [phone]       Will anyone else be joining the visit? NO  (If patient encounters technical issues they should call [phone])    Are changes needed to the allergy or medication list? Pt stated no changes to allergies and Pt stated no med changes    Are refills needed on medications prescribed by this physician? NO    Rooming Documentation:  Questionnaire(s) completed    Reason for visit: CARLOS Fofana F

## 2024-12-04 NOTE — PROGRESS NOTES
Virtual Visit Details    Type of service:  Video Visit   Video Start Time:  10:00am  Video End Time: 10:30am    Originating Location (pt. Location): Home    Distant Location (provider location):  On-site  Platform used for Video Visit: Reuben

## 2024-12-05 NOTE — TELEPHONE ENCOUNTER
{PROVIDER CHARTING PREFERENCE:493156}    Tomasa Styles is a 24 year old, presenting for the following health issues:  Pharyngitis and Headache  \  Sx started one week ago with sore throat, headaches and nasal congestions. Did have body aches and chills initially. Younger brother tested positive for COVID on Thanksgiving and antoher brother tested positive for influenza.     HE did multiple rapid covid tests that were negative.     Currently having sore throat and headaches. Body aches are improving. Nasal congestion also improving, using nasal spray which helps.             12/5/2024     1:08 PM   Additional Questions   Roomed by Nicole GUTIERREZ     History of Present Illness       Reason for visit:  Sore throat, headaches, maybe strep  Symptom onset:  1-2 weeks ago  Symptoms include:  Sore throat, headaches  Symptom intensity:  Moderate  Symptom progression:  Worsening  Had these symptoms before:  No  What makes it worse:  Swallowing  What makes it better:  Ibuprofen   He is taking medications regularly.       Pre-Provider Visit Orders - Rapid Strep  Does the patient have shortness of breath/trouble breathing, an earache, drooling/too much saliva, or any difficulty opening his mouth/moving neck?  No  Does the patient have a sore throat and either history of fever >100.4 in the previous 24 hours without a cough or recent exposure to a known case of strep throat? Yes {Patient MEETS CRITERIA OK to proceed with order Link to Pre-Provider Visit Standing Orders SmartSet :[id number]}  Acute Illness  Acute illness concerns: Sore throat   Onset/Duration: 1-2 weeks   Symptoms:  Fever: No  Chills/Sweats: YES  Headache (location?): YES  Sinus Pressure: YES  Conjunctivitis:  No  Ear Pain: no  Rhinorrhea: YES  Congestion: YES  Sore Throat: YES  Cough: no  Wheeze: No  Decreased Appetite: No  Nausea: No  Vomiting: No  Diarrhea: No  Dysuria/Freq.: No  Dysuria or Hematuria: No  Fatigue/Achiness: YES  Sick/Strep Exposure: YES- brother  Last seen: 10/5  RTC: 3 months   Cancel: 1/4, 12/28  No-show: none   Next appt: 1/18    Incoming refill from patient via phone      Disp Refills Start End YI   buprenorphine (SUBUTEX) 2 MG SUBL sublingual tablet 300 tablet 2 10/5/2021  No   Sig - Route: Place 10 tablets (20 mg) under the tongue daily - Sublingual   Sent to pharmacy as: Buprenorphine HCl 2 MG Sublingual Tablet Sublingual (SUBUTEX)   Class: E-Prescribe   Notes to Pharmacy: MARTÍNEZ: [id number]   Order: [id number]   E-Prescribing Status: Receipt confirmed by pharmacy (10/5/2021  3:28 PM CDT)   Prior authorization: Closed - The  is not the PA processor for this patient.   Per  last refilled: 12/4 #300, 11/8 #300, 10/5 #300    Will route to provider for approval    tested positive for Covid on Thanksgiving. Pt states he has tested negative for Covid a total of 4 time. Last Covid test was taken on Tuesday 12/3/24  Therapies tried and outcome: Dayquil- did not help. Ibuprofen- does help.     {additonal problems for provider to add (Optional):197258}    {ROS Picklists (Optional):768884}      Objective    There were no vitals taken for this visit.  There is no height or weight on file to calculate BMI.  Physical Exam   {Exam List (Optional):098970}    {Diagnostic Test Results (Optional):618875}        Signed Electronically by: Christie June MD  {Email feedback regarding this note to [email or web address]   :[id number]}

## 2025-03-12 ENCOUNTER — TELEPHONE (OUTPATIENT)
Dept: PSYCHIATRY | Facility: CLINIC | Age: 70
End: 2025-03-12
Payer: MEDICARE

## 2025-03-12 DIAGNOSIS — F11.21 OPIOID DEPENDENCE IN REMISSION (H): ICD-10-CM

## 2025-03-12 RX ORDER — BUPRENORPHINE 2 MG/1
20 TABLET SUBLINGUAL DAILY
Qty: 300 TABLET | Refills: 2 | Status: SHIPPED | OUTPATIENT
Start: 2025-03-12

## 2025-03-12 NOTE — TELEPHONE ENCOUNTER
Writer attempted to call Patient to let him know refill order has been approved and sent to pharmacy. No answer.     Writer will send Offerboardt message. No further action needed after that.     Thalia Garza, EMT

## 2025-03-12 NOTE — TELEPHONE ENCOUNTER
[facility] Call Center    Phone Message    May a detailed message be left on voicemail: yes     Reason for Call: Medication Refill Request    Has the patient contacted the pharmacy for the refill? Yes   Name of medication being requested: Buprenorphine   Provider who prescribed the medication: Dr. Barksdale  Pharmacy: [id number] [place], [place] - [address] AT [facility] [address]   Date medication is needed: 3/14/2025      Patient called requesting a refill for their prescription of Buprenorphine. The patient had to reschedule their follow-up appointment with Dr. Barksdale to next month, and will be out of their current prescription by the end of the week. The patient was hoping to get a call-back with an update once this prescription has been sent out and is ready to be picked up.    Action Taken: Message routed to:  Other: [facility] psychiatry nurse pool    Travel Screening: Not Applicable     Date of Service: 3/12/2025

## 2025-03-12 NOTE — TELEPHONE ENCOUNTER
Last seen: 12/4  RTC: 3 months  Any patient initiated cancellations or no shows since last visit? YES - 3/5 cancel  Next appt: 4/2  Last filled: 2/18 for 30 day supply     Incoming refill from patient via phone by patient or caregiver    Medication requested:   buprenorphine (SUBUTEX) 2 MG SUBL sublingual tablet     From chart note:   - Continue Subutex 20mg daily     Is note signed/closed? Yes    Is this a 90 day request for a psych medication? No    LPNs - Please check  if controlled and send to provider  Others - Send to RNs

## 2025-04-02 ENCOUNTER — OFFICE VISIT (OUTPATIENT)
Dept: PSYCHIATRY | Facility: CLINIC | Age: 70
End: 2025-04-02
Attending: PSYCHIATRY & NEUROLOGY
Payer: MEDICARE

## 2025-04-02 ENCOUNTER — LAB (OUTPATIENT)
Dept: LAB | Facility: CLINIC | Age: 70
End: 2025-04-02
Attending: PSYCHIATRY & NEUROLOGY
Payer: MEDICARE

## 2025-04-02 VITALS
HEART RATE: 76 BPM | DIASTOLIC BLOOD PRESSURE: 98 MMHG | BODY MASS INDEX: 31.16 KG/M2 | SYSTOLIC BLOOD PRESSURE: 147 MMHG | WEIGHT: 223.4 LBS

## 2025-04-02 DIAGNOSIS — F11.21 OPIOID DEPENDENCE IN REMISSION (H): ICD-10-CM

## 2025-04-02 DIAGNOSIS — F11.20 OPIOID USE DISORDER, SEVERE, ON MAINTENANCE THERAPY (H): ICD-10-CM

## 2025-04-02 DIAGNOSIS — F11.20 OPIOID USE DISORDER, SEVERE, ON MAINTENANCE THERAPY (H): Primary | ICD-10-CM

## 2025-04-02 LAB
AMPHETAMINES UR QL SCN: ABNORMAL
BARBITURATES UR QL SCN: ABNORMAL
BENZODIAZ UR QL SCN: ABNORMAL
BUPRENORPHINE UR QL: ABNORMAL
BZE UR QL SCN: ABNORMAL
CANNABINOIDS UR QL SCN: ABNORMAL
FENTANYL UR QL: ABNORMAL
OPIATES UR QL SCN: ABNORMAL
PCP QUAL URINE (ROCHE): ABNORMAL

## 2025-04-02 PROCEDURE — 80307 DRUG TEST PRSMV CHEM ANLYZR: CPT

## 2025-04-02 PROCEDURE — G0463 HOSPITAL OUTPT CLINIC VISIT: HCPCS | Performed by: STUDENT IN AN ORGANIZED HEALTH CARE EDUCATION/TRAINING PROGRAM

## 2025-04-02 ASSESSMENT — PAIN SCALES - GENERAL: PAINLEVEL_OUTOF10: SEVERE PAIN (8)
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
## 2025-04-02 NOTE — NURSING NOTE
Chief Complaint   Patient presents with    Recheck Medication     Opioid use disorder, severe, on maintenance therapy

## 2025-06-10 DIAGNOSIS — F11.21 OPIOID DEPENDENCE IN REMISSION (H): ICD-10-CM

## 2025-06-10 NOTE — TELEPHONE ENCOUNTER
[facility] Call Center    Phone Message    May a detailed message be left on voicemail: yes     Reason for Call: Medication Refill Request    Has the patient contacted the pharmacy for the refill? Yes   Name of medication being requested: Subutex 2mg  Provider who prescribed the medication: Dr. Barksdale  Pharmacy: [id number] - [place], [place] - [address] AT [facility] [address]   Date medication is needed: Patient stated their refill is due on 6/12 but they are not seeing the provider until 6/25. Patient asked that this be sent to their pharmacy and would like a call when this is sent due to their pharmacy being the next town over.    Action Taken: Message routed to:  Other: [facility] Nurse pool    Travel Screening: Not Applicable     Date of Service:

## 2025-06-11 RX ORDER — BUPRENORPHINE 2 MG/1
20 TABLET SUBLINGUAL DAILY
Qty: 300 TABLET | Refills: 0 | Status: SHIPPED | OUTPATIENT
Start: 2025-06-11

## 2025-06-11 NOTE — TELEPHONE ENCOUNTER
Attempted to call patient as requested, no answer. Left  update that refill was sent to his pharmacy. Requested he return call if he has any questions.

## 2025-06-21 ENCOUNTER — HEALTH MAINTENANCE LETTER (OUTPATIENT)
Age: 70
End: 2025-06-21

## 2025-06-25 ENCOUNTER — OFFICE VISIT (OUTPATIENT)
Dept: PSYCHIATRY | Facility: CLINIC | Age: 70
End: 2025-06-25
Attending: PSYCHIATRY & NEUROLOGY
Payer: MEDICARE

## 2025-06-25 VITALS
DIASTOLIC BLOOD PRESSURE: 78 MMHG | BODY MASS INDEX: 31.05 KG/M2 | HEART RATE: 80 BPM | TEMPERATURE: 99 F | SYSTOLIC BLOOD PRESSURE: 146 MMHG | WEIGHT: 222.6 LBS

## 2025-06-25 DIAGNOSIS — F11.21 OPIOID DEPENDENCE IN REMISSION (H): ICD-10-CM

## 2025-06-25 PROCEDURE — G0463 HOSPITAL OUTPT CLINIC VISIT: HCPCS | Performed by: STUDENT IN AN ORGANIZED HEALTH CARE EDUCATION/TRAINING PROGRAM

## 2025-06-25 RX ORDER — BUPRENORPHINE 2 MG/1
20 TABLET SUBLINGUAL DAILY
Qty: 300 TABLET | Refills: 2 | Status: SHIPPED | OUTPATIENT
Start: 2025-06-25

## 2025-06-25 ASSESSMENT — PAIN SCALES - GENERAL: PAINLEVEL_OUTOF10: NO PAIN (0)
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
## 2025-06-25 NOTE — NURSING NOTE
Chief Complaint   Patient presents with    Recheck Medication     Opioid use disorder, severe, on maintenance therapy      - David GIRARD, Visit Facilitator

## 2025-09-04 ENCOUNTER — TELEPHONE (OUTPATIENT)
Dept: PSYCHIATRY | Facility: CLINIC | Age: 70
End: 2025-09-04
Payer: MEDICARE